# Patient Record
Sex: MALE | Employment: FULL TIME | URBAN - METROPOLITAN AREA
[De-identification: names, ages, dates, MRNs, and addresses within clinical notes are randomized per-mention and may not be internally consistent; named-entity substitution may affect disease eponyms.]

---

## 2023-03-02 LAB
CELLS COUNTED TOTAL (#) IN BODY FLUID: 100
CELLS COUNTED TOTAL (#) IN BODY FLUID: 100
CLARITY FLUID: NORMAL
CLARITY FLUID: NORMAL
COLOR OF BODY FLUID: NORMAL
COLOR OF BODY FLUID: NORMAL
ERYTHROCYTES (/UL) IN BODY FLUID: 1000 /UL
ERYTHROCYTES (/UL) IN BODY FLUID: 40 /UL
LEUKOCYTES (/UL) IN BODY FLUID: 2429 /UL
LEUKOCYTES (/UL) IN BODY FLUID: 2471 /UL
LYMPHOCYTES/100 LEUKOCYTES IN BODY FLUID BY MAN CT: 36 %
LYMPHOCYTES/100 LEUKOCYTES IN BODY FLUID BY MAN CT: 39 %
MONOCYTES+MACROPHAGES/100 WBC IN BODY FLUID BY MAN CT: 19 %
MONOCYTES+MACROPHAGES/100 WBC IN BODY FLUID BY MAN CT: 33 %
NEUTROPHILS/100 LEUKOCYTES IN BODY FLUID BY MANUAL COUNT: 31 %
NEUTROPHILS/100 LEUKOCYTES IN BODY FLUID BY MANUAL COUNT: 42 %

## 2023-03-03 LAB
CRYSTALS PATH REVIEW: NORMAL
CRYSTALS PATH REVIEW: NORMAL
JOINT FLUID CRYSTALS: NORMAL
JOINT FLUID CRYSTALS: NORMAL

## 2023-03-07 LAB
GRAM STAIN: NORMAL
GRAM STAIN: NORMAL
STERILE FLUID CULTURE/SMEAR: NORMAL
STERILE FLUID CULTURE/SMEAR: NORMAL

## 2023-03-21 LAB
ALANINE AMINOTRANSFERASE (SGPT) (U/L) IN SER/PLAS: 27 U/L (ref 10–52)
ALBUMIN (G/DL) IN SER/PLAS: 4.6 G/DL (ref 3.4–5)
ALKALINE PHOSPHATASE (U/L) IN SER/PLAS: 93 U/L (ref 33–120)
ANION GAP IN SER/PLAS: 16 MMOL/L (ref 10–20)
APPEARANCE, URINE: CLEAR
ASPARTATE AMINOTRANSFERASE (SGOT) (U/L) IN SER/PLAS: 27 U/L (ref 9–39)
BASOPHILS (10*3/UL) IN BLOOD BY AUTOMATED COUNT: 0.06 X10E9/L (ref 0–0.1)
BASOPHILS/100 LEUKOCYTES IN BLOOD BY AUTOMATED COUNT: 0.7 % (ref 0–2)
BILIRUBIN TOTAL (MG/DL) IN SER/PLAS: 0.4 MG/DL (ref 0–1.2)
BILIRUBIN, URINE: NEGATIVE
BLOOD, URINE: NEGATIVE
C REACTIVE PROTEIN (MG/L) IN SER/PLAS: 0.75 MG/DL
CALCIUM (MG/DL) IN SER/PLAS: 9.7 MG/DL (ref 8.6–10.3)
CARBON DIOXIDE, TOTAL (MMOL/L) IN SER/PLAS: 24 MMOL/L (ref 21–32)
CHLORIDE (MMOL/L) IN SER/PLAS: 101 MMOL/L (ref 98–107)
COLOR, URINE: YELLOW
COMPLEMENT C3 (MG/DL) IN SER/PLAS: 180 MG/DL (ref 87–200)
COMPLEMENT C4 (MG/DL) IN SER/PLAS: 58 MG/DL (ref 10–50)
CREATININE (MG/DL) IN SER/PLAS: 1.14 MG/DL (ref 0.5–1.3)
EOSINOPHILS (10*3/UL) IN BLOOD BY AUTOMATED COUNT: 0.13 X10E9/L (ref 0–0.7)
EOSINOPHILS/100 LEUKOCYTES IN BLOOD BY AUTOMATED COUNT: 1.6 % (ref 0–6)
ERYTHROCYTE DISTRIBUTION WIDTH (RATIO) BY AUTOMATED COUNT: 14.7 % (ref 11.5–14.5)
ERYTHROCYTE MEAN CORPUSCULAR HEMOGLOBIN CONCENTRATION (G/DL) BY AUTOMATED: 32.3 G/DL (ref 32–36)
ERYTHROCYTE MEAN CORPUSCULAR VOLUME (FL) BY AUTOMATED COUNT: 86 FL (ref 80–100)
ERYTHROCYTES (10*6/UL) IN BLOOD BY AUTOMATED COUNT: 5.06 X10E12/L (ref 4.5–5.9)
GFR MALE: 81 ML/MIN/1.73M2
GLUCOSE (MG/DL) IN SER/PLAS: 124 MG/DL (ref 74–99)
GLUCOSE, URINE: NEGATIVE MG/DL
HEMATOCRIT (%) IN BLOOD BY AUTOMATED COUNT: 43.6 % (ref 41–52)
HEMOGLOBIN (G/DL) IN BLOOD: 14.1 G/DL (ref 13.5–17.5)
HEPATITIS B VIRUS CORE AB (PRESENCE) IN SER/PLAS BY IMM: NONREACTIVE
HEPATITIS B VIRUS SURFACE AB (MIU/ML) IN SERUM: <3.1 MIU/ML
HEPATITIS B VIRUS SURFACE AG PRESENCE IN SERUM: NONREACTIVE
HEPATITIS C VIRUS AB PRESENCE IN SERUM: NONREACTIVE
IGG SUBCLASS 4 (MG/DL) IN SERUM: 12 MG/DL (ref 3–200)
IMMATURE GRANULOCYTES/100 LEUKOCYTES IN BLOOD BY AUTOMATED COUNT: 0.2 % (ref 0–0.9)
KETONES, URINE: NEGATIVE MG/DL
LEUKOCYTE ESTERASE, URINE: NEGATIVE
LEUKOCYTES (10*3/UL) IN BLOOD BY AUTOMATED COUNT: 8.3 X10E9/L (ref 4.4–11.3)
LYMPHOCYTES (10*3/UL) IN BLOOD BY AUTOMATED COUNT: 1.98 X10E9/L (ref 1.2–4.8)
LYMPHOCYTES/100 LEUKOCYTES IN BLOOD BY AUTOMATED COUNT: 23.8 % (ref 13–44)
MONOCYTES (10*3/UL) IN BLOOD BY AUTOMATED COUNT: 0.47 X10E9/L (ref 0.1–1)
MONOCYTES/100 LEUKOCYTES IN BLOOD BY AUTOMATED COUNT: 5.6 % (ref 2–10)
NEUTROPHILS (10*3/UL) IN BLOOD BY AUTOMATED COUNT: 5.66 X10E9/L (ref 1.2–7.7)
NEUTROPHILS/100 LEUKOCYTES IN BLOOD BY AUTOMATED COUNT: 68.1 % (ref 40–80)
NITRITE, URINE: NEGATIVE
PH, URINE: 5 (ref 5–8)
PLATELETS (10*3/UL) IN BLOOD AUTOMATED COUNT: 344 X10E9/L (ref 150–450)
POTASSIUM (MMOL/L) IN SER/PLAS: 3.8 MMOL/L (ref 3.5–5.3)
PROTEIN TOTAL: 7.8 G/DL (ref 6.4–8.2)
PROTEIN, URINE: NEGATIVE MG/DL
RHEUMATOID FACTOR (IU/ML) IN SERUM OR PLASMA: <10 IU/ML (ref 0–15)
SEDIMENTATION RATE, ERYTHROCYTE: 51 MM/H (ref 0–15)
SODIUM (MMOL/L) IN SER/PLAS: 137 MMOL/L (ref 136–145)
SPECIFIC GRAVITY, URINE: 1.03 (ref 1–1.03)
URATE (MG/DL) IN SER/PLAS: 7.6 MG/DL (ref 4–7.5)
UREA NITROGEN (MG/DL) IN SER/PLAS: 20 MG/DL (ref 6–23)
UROBILINOGEN, URINE: <2 MG/DL (ref 0–1.9)

## 2023-03-22 LAB
ANA PATTERN: ABNORMAL
ANA TITER: ABNORMAL
ANTI-CENTROMERE: <0.2 AI
ANTI-CHROMATIN: <0.2 AI
ANTI-DNA (DS): <1 IU/ML
ANTI-JO-1 IGG: <0.2 AI
ANTI-NUCLEAR ANTIBODY (ANA): POSITIVE
ANTI-RIBOSOMAL P: <0.2 AI
ANTI-RNP: <0.2 AI
ANTI-SCL-70: <0.2 AI
ANTI-SM/RNP: <0.2 AI
ANTI-SM: <0.2 AI
ANTI-SSA: <0.2 AI
ANTI-SSB: <0.2 AI

## 2023-03-23 LAB
HLAB27 TYPING: NEGATIVE
NIL(NEG) CONTROL SPOT COUNT: NORMAL
PANEL A SPOT COUNT: 1
PANEL B SPOT COUNT: 0
POS CONTROL SPOT COUNT: NORMAL
T-SPOT. TB INTERPRETATION: NEGATIVE

## 2023-03-24 LAB
ACHR BINDING AB, SERUM: 0 NMOL/L (ref 0–0.4)
CITRULLINE ANTIBODY, IGG: 2 UNITS (ref 0–19)
LYME ANTIBODIES SCREEN: 0.1 LIV (ref 0–1.2)

## 2023-05-16 LAB
ALANINE AMINOTRANSFERASE (SGPT) (U/L) IN SER/PLAS: 26 U/L (ref 10–52)
ALBUMIN (G/DL) IN SER/PLAS: 4.6 G/DL (ref 3.4–5)
ALKALINE PHOSPHATASE (U/L) IN SER/PLAS: 71 U/L (ref 33–120)
ANION GAP IN SER/PLAS: 10 MMOL/L (ref 10–20)
ASPARTATE AMINOTRANSFERASE (SGOT) (U/L) IN SER/PLAS: 28 U/L (ref 9–39)
BASOPHILS (10*3/UL) IN BLOOD BY AUTOMATED COUNT: 0.06 X10E9/L (ref 0–0.1)
BASOPHILS/100 LEUKOCYTES IN BLOOD BY AUTOMATED COUNT: 0.8 % (ref 0–2)
BILIRUBIN TOTAL (MG/DL) IN SER/PLAS: 0.3 MG/DL (ref 0–1.2)
C REACTIVE PROTEIN (MG/L) IN SER/PLAS: 0.64 MG/DL
CALCIUM (MG/DL) IN SER/PLAS: 10 MG/DL (ref 8.6–10.3)
CARBON DIOXIDE, TOTAL (MMOL/L) IN SER/PLAS: 27 MMOL/L (ref 21–32)
CHLORIDE (MMOL/L) IN SER/PLAS: 103 MMOL/L (ref 98–107)
CREATININE (MG/DL) IN SER/PLAS: 1.09 MG/DL (ref 0.5–1.3)
EOSINOPHILS (10*3/UL) IN BLOOD BY AUTOMATED COUNT: 0.13 X10E9/L (ref 0–0.7)
EOSINOPHILS/100 LEUKOCYTES IN BLOOD BY AUTOMATED COUNT: 1.7 % (ref 0–6)
ERYTHROCYTE DISTRIBUTION WIDTH (RATIO) BY AUTOMATED COUNT: 15.3 % (ref 11.5–14.5)
ERYTHROCYTE MEAN CORPUSCULAR HEMOGLOBIN CONCENTRATION (G/DL) BY AUTOMATED: 31.9 G/DL (ref 32–36)
ERYTHROCYTE MEAN CORPUSCULAR VOLUME (FL) BY AUTOMATED COUNT: 90 FL (ref 80–100)
ERYTHROCYTES (10*6/UL) IN BLOOD BY AUTOMATED COUNT: 4.64 X10E12/L (ref 4.5–5.9)
GFR MALE: 85 ML/MIN/1.73M2
GLUCOSE (MG/DL) IN SER/PLAS: 86 MG/DL (ref 74–99)
HEMATOCRIT (%) IN BLOOD BY AUTOMATED COUNT: 41.7 % (ref 41–52)
HEMOGLOBIN (G/DL) IN BLOOD: 13.3 G/DL (ref 13.5–17.5)
IMMATURE GRANULOCYTES/100 LEUKOCYTES IN BLOOD BY AUTOMATED COUNT: 0.3 % (ref 0–0.9)
LEUKOCYTES (10*3/UL) IN BLOOD BY AUTOMATED COUNT: 7.8 X10E9/L (ref 4.4–11.3)
LYMPHOCYTES (10*3/UL) IN BLOOD BY AUTOMATED COUNT: 2.07 X10E9/L (ref 1.2–4.8)
LYMPHOCYTES/100 LEUKOCYTES IN BLOOD BY AUTOMATED COUNT: 26.5 % (ref 13–44)
MONOCYTES (10*3/UL) IN BLOOD BY AUTOMATED COUNT: 0.7 X10E9/L (ref 0.1–1)
MONOCYTES/100 LEUKOCYTES IN BLOOD BY AUTOMATED COUNT: 9 % (ref 2–10)
NEUTROPHILS (10*3/UL) IN BLOOD BY AUTOMATED COUNT: 4.83 X10E9/L (ref 1.2–7.7)
NEUTROPHILS/100 LEUKOCYTES IN BLOOD BY AUTOMATED COUNT: 61.7 % (ref 40–80)
PLATELETS (10*3/UL) IN BLOOD AUTOMATED COUNT: 353 X10E9/L (ref 150–450)
POTASSIUM (MMOL/L) IN SER/PLAS: 4.1 MMOL/L (ref 3.5–5.3)
PROTEIN TOTAL: 7.8 G/DL (ref 6.4–8.2)
SEDIMENTATION RATE, ERYTHROCYTE: 20 MM/H (ref 0–15)
SODIUM (MMOL/L) IN SER/PLAS: 136 MMOL/L (ref 136–145)
UREA NITROGEN (MG/DL) IN SER/PLAS: 20 MG/DL (ref 6–23)

## 2023-07-18 LAB
ALANINE AMINOTRANSFERASE (SGPT) (U/L) IN SER/PLAS: 30 U/L (ref 10–52)
ALBUMIN (G/DL) IN SER/PLAS: 4.6 G/DL (ref 3.4–5)
ALKALINE PHOSPHATASE (U/L) IN SER/PLAS: 80 U/L (ref 33–120)
ANION GAP IN SER/PLAS: 17 MMOL/L (ref 10–20)
ASPARTATE AMINOTRANSFERASE (SGOT) (U/L) IN SER/PLAS: 34 U/L (ref 9–39)
BASOPHILS (10*3/UL) IN BLOOD BY AUTOMATED COUNT: 0.07 X10E9/L (ref 0–0.1)
BASOPHILS/100 LEUKOCYTES IN BLOOD BY AUTOMATED COUNT: 0.9 % (ref 0–2)
BILIRUBIN TOTAL (MG/DL) IN SER/PLAS: 0.4 MG/DL (ref 0–1.2)
C REACTIVE PROTEIN (MG/L) IN SER/PLAS: 0.79 MG/DL
CALCIUM (MG/DL) IN SER/PLAS: 9.8 MG/DL (ref 8.6–10.3)
CARBON DIOXIDE, TOTAL (MMOL/L) IN SER/PLAS: 25 MMOL/L (ref 21–32)
CHLORIDE (MMOL/L) IN SER/PLAS: 100 MMOL/L (ref 98–107)
CREATININE (MG/DL) IN SER/PLAS: 1.06 MG/DL (ref 0.5–1.3)
EOSINOPHILS (10*3/UL) IN BLOOD BY AUTOMATED COUNT: 0.13 X10E9/L (ref 0–0.7)
EOSINOPHILS/100 LEUKOCYTES IN BLOOD BY AUTOMATED COUNT: 1.6 % (ref 0–6)
ERYTHROCYTE DISTRIBUTION WIDTH (RATIO) BY AUTOMATED COUNT: 14.9 % (ref 11.5–14.5)
ERYTHROCYTE MEAN CORPUSCULAR HEMOGLOBIN CONCENTRATION (G/DL) BY AUTOMATED: 32.7 G/DL (ref 32–36)
ERYTHROCYTE MEAN CORPUSCULAR VOLUME (FL) BY AUTOMATED COUNT: 90 FL (ref 80–100)
ERYTHROCYTES (10*6/UL) IN BLOOD BY AUTOMATED COUNT: 4.74 X10E12/L (ref 4.5–5.9)
GFR MALE: 88 ML/MIN/1.73M2
GLUCOSE (MG/DL) IN SER/PLAS: 97 MG/DL (ref 74–99)
HEMATOCRIT (%) IN BLOOD BY AUTOMATED COUNT: 42.5 % (ref 41–52)
HEMOGLOBIN (G/DL) IN BLOOD: 13.9 G/DL (ref 13.5–17.5)
IMMATURE GRANULOCYTES/100 LEUKOCYTES IN BLOOD BY AUTOMATED COUNT: 0.2 % (ref 0–0.9)
LEUKOCYTES (10*3/UL) IN BLOOD BY AUTOMATED COUNT: 8.1 X10E9/L (ref 4.4–11.3)
LYMPHOCYTES (10*3/UL) IN BLOOD BY AUTOMATED COUNT: 1.73 X10E9/L (ref 1.2–4.8)
LYMPHOCYTES/100 LEUKOCYTES IN BLOOD BY AUTOMATED COUNT: 21.4 % (ref 13–44)
MONOCYTES (10*3/UL) IN BLOOD BY AUTOMATED COUNT: 0.71 X10E9/L (ref 0.1–1)
MONOCYTES/100 LEUKOCYTES IN BLOOD BY AUTOMATED COUNT: 8.8 % (ref 2–10)
NEUTROPHILS (10*3/UL) IN BLOOD BY AUTOMATED COUNT: 5.44 X10E9/L (ref 1.2–7.7)
NEUTROPHILS/100 LEUKOCYTES IN BLOOD BY AUTOMATED COUNT: 67.1 % (ref 40–80)
PLATELETS (10*3/UL) IN BLOOD AUTOMATED COUNT: 334 X10E9/L (ref 150–450)
POTASSIUM (MMOL/L) IN SER/PLAS: 4.5 MMOL/L (ref 3.5–5.3)
PROTEIN TOTAL: 7.9 G/DL (ref 6.4–8.2)
SEDIMENTATION RATE, ERYTHROCYTE: 27 MM/H (ref 0–15)
SODIUM (MMOL/L) IN SER/PLAS: 137 MMOL/L (ref 136–145)
UREA NITROGEN (MG/DL) IN SER/PLAS: 19 MG/DL (ref 6–23)

## 2023-09-05 ENCOUNTER — TELEPHONE (OUTPATIENT)
Dept: PRIMARY CARE | Facility: CLINIC | Age: 43
End: 2023-09-05
Payer: COMMERCIAL

## 2023-09-05 NOTE — TELEPHONE ENCOUNTER
Rx Refill Request Telephone Encounter    Name:  Laureano Dowell  :  [id number]  Medication Name:  hydrochlorothiazide  Specific Pharmacy location:  [facility]  Date of last appointment:  2022 (annual)  Date of next appointment:  N/A  Best number to reach patient:  [phone]

## 2023-09-07 NOTE — TELEPHONE ENCOUNTER
Laureano's wife called to ask about her prescription. Ashtyn informed them that he needs to schedule a visit in order to receive any refills. She said she will inform him of this so he can call back and schedule.

## 2023-09-18 PROBLEM — M62.89 PELVIC FLOOR DYSFUNCTION: Status: ACTIVE | Noted: 2023-09-18

## 2023-09-18 PROBLEM — F41.1 GENERALIZED ANXIETY DISORDER: Status: ACTIVE | Noted: 2023-09-18

## 2023-09-18 PROBLEM — R73.9 HYPERGLYCEMIA: Status: ACTIVE | Noted: 2023-09-18

## 2023-09-18 PROBLEM — L40.50 PSORIATIC ARTHROPATHY (MULTI): Status: ACTIVE | Noted: 2023-09-18

## 2023-09-18 PROBLEM — K21.9 ACID REFLUX: Status: ACTIVE | Noted: 2023-09-18

## 2023-09-18 PROBLEM — S86.819A STRAIN OF CALF MUSCLE: Status: ACTIVE | Noted: 2023-09-18

## 2023-09-18 PROBLEM — M17.11 ARTHRITIS OF KNEE, RIGHT: Status: ACTIVE | Noted: 2023-09-18

## 2023-09-18 PROBLEM — F41.9 ANXIETY: Status: ACTIVE | Noted: 2023-09-18

## 2023-09-18 PROBLEM — M25.461 EFFUSION OF RIGHT KNEE: Status: ACTIVE | Noted: 2023-09-18

## 2023-09-18 PROBLEM — I10 HTN (HYPERTENSION): Status: ACTIVE | Noted: 2023-09-18

## 2023-09-18 PROBLEM — E66.3 OVER WEIGHT: Status: ACTIVE | Noted: 2023-09-18

## 2023-09-18 PROBLEM — N34.2 URETHRITIS: Status: ACTIVE | Noted: 2023-09-18

## 2023-09-18 PROBLEM — L40.9 PSORIASIS: Status: ACTIVE | Noted: 2023-09-18

## 2023-09-18 PROBLEM — F41.0 PANIC ATTACKS: Status: ACTIVE | Noted: 2023-09-18

## 2023-09-18 PROBLEM — K76.0 FATTY LIVER: Status: ACTIVE | Noted: 2023-09-18

## 2023-09-18 PROBLEM — E78.5 HLD (HYPERLIPIDEMIA): Status: ACTIVE | Noted: 2023-09-18

## 2023-09-18 PROBLEM — S39.013A: Status: ACTIVE | Noted: 2023-09-18

## 2023-09-18 RX ORDER — LISINOPRIL 30 MG/1
1 TABLET ORAL DAILY
COMMUNITY
Start: 2021-08-23 | End: 2023-12-11 | Stop reason: SDUPTHER

## 2023-09-18 RX ORDER — PREDNISONE 20 MG/1
TABLET ORAL
COMMUNITY
End: 2024-01-11 | Stop reason: WASHOUT

## 2023-09-18 RX ORDER — NAPROXEN 500 MG/1
1 TABLET ORAL EVERY 12 HOURS
COMMUNITY
Start: 2021-07-20 | End: 2024-01-11 | Stop reason: WASHOUT

## 2023-09-18 RX ORDER — FOLIC ACID 1 MG/1
1 TABLET ORAL DAILY
COMMUNITY
End: 2023-10-16 | Stop reason: SDUPTHER

## 2023-09-18 RX ORDER — DOXYCYCLINE 100 MG/1
1 CAPSULE ORAL 2 TIMES DAILY
COMMUNITY
Start: 2022-12-29 | End: 2024-01-11 | Stop reason: WASHOUT

## 2023-09-18 RX ORDER — METHOTREXATE 2.5 MG/1
TABLET ORAL
COMMUNITY
End: 2023-10-18 | Stop reason: SDUPTHER

## 2023-09-18 RX ORDER — LOVASTATIN 40 MG/1
80 TABLET ORAL DAILY
COMMUNITY
End: 2023-12-11 | Stop reason: SDUPTHER

## 2023-09-18 RX ORDER — CYCLOBENZAPRINE HCL 5 MG
1 TABLET ORAL NIGHTLY PRN
COMMUNITY
Start: 2021-07-20 | End: 2024-01-11 | Stop reason: WASHOUT

## 2023-09-18 RX ORDER — OMEPRAZOLE 20 MG/1
TABLET, ORALLY DISINTEGRATING, DELAYED RELEASE ORAL
COMMUNITY

## 2023-09-18 RX ORDER — ALPRAZOLAM 0.5 MG/1
1 TABLET ORAL 2 TIMES DAILY
COMMUNITY
End: 2024-01-11 | Stop reason: WASHOUT

## 2023-09-18 RX ORDER — COLCHICINE 0.6 MG/1
TABLET ORAL
COMMUNITY
Start: 2023-01-30 | End: 2024-01-11 | Stop reason: WASHOUT

## 2023-10-05 DIAGNOSIS — E66.3 OVER WEIGHT: Primary | ICD-10-CM

## 2023-10-15 DIAGNOSIS — L40.50 PSORIATIC ARTHROPATHY (MULTI): Primary | ICD-10-CM

## 2023-10-16 ENCOUNTER — LAB (OUTPATIENT)
Dept: LAB | Facility: LAB | Age: 43
End: 2023-10-16
Payer: COMMERCIAL

## 2023-10-16 DIAGNOSIS — L40.50 PSORIATIC ARTHRITIS (MULTI): ICD-10-CM

## 2023-10-16 DIAGNOSIS — E66.3 OVER WEIGHT: ICD-10-CM

## 2023-10-16 DIAGNOSIS — Z79.899 HIGH RISK MEDICATION USE: ICD-10-CM

## 2023-10-16 LAB
CHOLEST SERPL-MCNC: 213 MG/DL (ref 0–199)
CHOLESTEROL/HDL RATIO: 3.4
EST. AVERAGE GLUCOSE BLD GHB EST-MCNC: 126 MG/DL
HBA1C MFR BLD: 6 %
HDLC SERPL-MCNC: 62.8 MG/DL
LDLC SERPL CALC-MCNC: 104 MG/DL
NON HDL CHOLESTEROL: 150 MG/DL (ref 0–149)
TRIGL SERPL-MCNC: 231 MG/DL (ref 0–149)
VLDL: 46 MG/DL (ref 0–40)

## 2023-10-16 PROCEDURE — 80053 COMPREHEN METABOLIC PANEL: CPT

## 2023-10-16 PROCEDURE — 80061 LIPID PANEL: CPT

## 2023-10-16 PROCEDURE — 83036 HEMOGLOBIN GLYCOSYLATED A1C: CPT

## 2023-10-16 PROCEDURE — 36415 COLL VENOUS BLD VENIPUNCTURE: CPT

## 2023-10-16 PROCEDURE — 86140 C-REACTIVE PROTEIN: CPT

## 2023-10-16 RX ORDER — FOLIC ACID 1 MG/1
1 TABLET ORAL DAILY
Qty: 30 TABLET | Refills: 2 | Status: SHIPPED | OUTPATIENT
Start: 2023-10-16 | End: 2023-10-18 | Stop reason: SDUPTHER

## 2023-10-16 NOTE — PROGRESS NOTES
Rheumatology Follow Up Outpatient  Note    Subjective   Laureano Dowell is a 43 y.o. male presenting today for No chief complaint on file..    History of Presenting Problem:   Rheum history:  Psoriatic arthritis: recurrent episodes of swelling in right knee (no associated hotness or redness) that resolves after 3-4 days with use of NSAIDs. S/p 2 arthrocentesis for drainage and last time 3/2023 he also had a bilateral knee cortisone injections. He is diagnosed with psoriasis on his knees for 15-20 years.  No eye inflammation, GI disease. No recent GI or  infections. No tick bites/Lyme. No enthesitis or dactylitis.  Arthrocentesis synovial fluid analysis 3/2023 and WBC 2741, N 31%, L 39%, negative cultures, no crystals.  He was started on MTX 3/2023.     Interval history:  He is on MTX  6 pills per week, he is tolerating well ***except for some fatigue for 24 hours post the dose. He denies any new joint swelling. His knee swelling never came back since he was started on MTX. No fever of cough and no recent infection.       Past Medical History:   Past Medical History:   Diagnosis Date    Lower abdominal pain, unspecified     Groin pain    Other conditions influencing health status 05/26/2020    History of cough    Personal history of other specified conditions 06/25/2020    History of dysuria    Viral infection, unspecified 05/26/2020    Headache due to viral infection       Allergies:   Allergies   Allergen Reactions    Erythromycin Unknown       Medications:   Current Outpatient Medications:     ALPRAZolam (Xanax) 0.5 mg tablet, Take 1 tablet (0.5 mg) by mouth 2 times a day., Disp: , Rfl:     colchicine, gout, 0.6 mg tablet, , Disp: , Rfl:     cyclobenzaprine (Flexeril) 5 mg tablet, Take 1 tablet (5 mg) by mouth as needed at bedtime., Disp: , Rfl:     doxycycline (Vibramycin) 100 mg capsule, Take 1 capsule (100 mg) by mouth 2 times a day., Disp: , Rfl:     folic acid (Folvite) 1 mg tablet, TAKE 1 TABLET BY MOUTH  "DAILY, Disp: 30 tablet, Rfl: 2    hydroCHLOROthiazide (Microzide) 12.5 mg capsule, Take 1 capsule (12.5 mg) by mouth once daily., Disp: , Rfl:     lisinopril 30 mg tablet, Take 1 tablet (30 mg) by mouth once daily., Disp: , Rfl:     lovastatin (Mevacor) 40 mg tablet, Take 2 tablets (80 mg) by mouth once daily., Disp: , Rfl:     methotrexate (Trexall) 2.5 mg tablet, TAKE 6 TABLETS BY MOUTH EVERY WEEK, Disp: , Rfl:     naproxen (Naprosyn) 500 mg tablet, Take 1 tablet (500 mg) by mouth every 12 hours., Disp: , Rfl:     omeprazole 20 mg tablet,disintegrat, delay rel, Omeprazole 20 MG Oral Tablet Delayed Release Disintegrating Refills: 0, Disp: , Rfl:     predniSONE (Deltasone) 20 mg tablet, , Disp: , Rfl:     Review of Systems:   Constitutional: Denies fever, chills   Eyes: Denies dry eyes, pain in the eyes   ENT: Denies dry mouth, loss of taste, sores in the mouth  Cardiovascular: Denies chest pain, palpitations   Respiratory: Denies shortness of breath   Gastrointestinal: Denies heartburn   Integumentary: Denies photosensitivity, rash or lesions, Raynaud's   Neurological: Denies any numbness or tingling    MSK: As per HPI.     All 10 review of systems have been reviewed and are negative for complaint except as noted in the HPI    Objective   Physical Examination:  There were no vitals filed for this visit.  Growth %ile SmartLinks can only be used for patients less than 20 years old.  Ht Readings from Last 1 Encounters:   07/18/23 1.778 m (5' 10\")     Wt Readings from Last 1 Encounters:   07/18/23 105 kg (231 lb)       General - NAD, sitting up in chair, well-groomed, pleasant, AAOx3  Head: Normocephalic, atraumatic  Eyes - PERRLA, EOMI. No conjunctiva injection.   Mouth/ENT - Moist oral and nasal mucosa. No facial rash.   Cardiovascular - Normal S1, S2. Regular rate and rhythm. No murmurs or rubs.  Lungs - Symmetric chest expansion. Clear to auscultation bilaterally.   Skin - No rashes or ulcers. Skin warm and dry. No " erythema on bilateral cheeks.  Extremities - No edema, cyanosis ,or clubbing  Neurological - Alert and oriented x 3,  grossly intact. No focal deficit.  Musculoskeletal -  Shoulders: Full ROM, without pain, no swelling, warmth or tenderness.  Elbows: Full ROM, without pain, no swelling, warmth or tenderness.  Wrists: Full ROM, without pain, no swelling, warmth or tenderness.  MCP: No swelling, warmth or tenderness. Metacarpal squeeze negative  PIP: No swelling, warmth or tenderness.  DIP: No swelling, warmth or tenderness.  Hands : 5/5.    Sacroiliac joints: No local tenderness. YOLI negative.   Hips: Full ROM.  No malalignment.  Knees:  Full ROM, without pain, no swelling, warmth or point tenderness.   Ankles: Full ROM, without pain, swelling, warmth or tenderness.  Toes:  Metatarsal squeeze negative  Cervical spine: No tenderness or limitation of movement  Lumbar spine: No tenderness or limitation of movement      Assessment/Plan   Laureano Dowell is a 43 y.o. male with PMHx of PMHx of HTN, HLP, anxiety, psoriasis, ?h/o urethritis, and right knee effusion who presenting today for follow up on PsA    #Psoriatic arthritis:  -Reviewed knee arthrocentesis synovial fluid analysis 3/2023 and WBC 2741, N 31%, L 39%, negative cultures, no crystals.  -Synovial fluid from 2/2023: WBC: 483 with 82% L and 18% mono, negative cultures, no crystals  -HLA B27 negative 3/2023  -Reviewed serologies and Xrays to rule out RA, SLE, sarcoidosis, Lyme and other etiologies of oligoarthritis and negative 3/2023.   -Reviewed SI Xrays 3/2023 to evaluate for axial disease and negative.   -Started MTX 3/2023 and he is on 15 mg weekly and tolerating well  -Reviewed labs from ***7/2023 and CBC/CMP ESR down to 27 and CRP normal    ##Medical management of high risk medication:  -Drug name: MTX  -Tolerating and no side effects  -Folic acid daily  -Labs reviewed as above  -Most recent TB/hep tests negative 3/2023  -Patient is advised to hold  therapy if they experience any signs of infection requiring antibiotic therapy, and to resume after full recovery and conclusion of antibiotic course. Patient is advised to contact provider in this case.  -Educated the patient about risks and benefits of such therapy, and that the benefits of this therapy outweigh the risks and thus we will continue.        The assessment and plan, risk and benefits were discussed with the patient. All of the patients questions were answered and patient agrees to the plan.      Altaf Arthur MD  Clinical   Department of Rheumatology   [facility]

## 2023-10-18 ENCOUNTER — APPOINTMENT (OUTPATIENT)
Dept: RHEUMATOLOGY | Facility: CLINIC | Age: 43
End: 2023-10-18
Payer: COMMERCIAL

## 2023-10-18 ENCOUNTER — TELEMEDICINE (OUTPATIENT)
Dept: RHEUMATOLOGY | Facility: CLINIC | Age: 43
End: 2023-10-18
Payer: COMMERCIAL

## 2023-10-18 DIAGNOSIS — L40.50 PSORIATIC ARTHROPATHY (MULTI): ICD-10-CM

## 2023-10-18 DIAGNOSIS — L40.50 PSORIATIC ARTHRITIS (MULTI): Primary | ICD-10-CM

## 2023-10-18 DIAGNOSIS — Z79.899 HIGH RISK MEDICATION USE: ICD-10-CM

## 2023-10-18 LAB
ALBUMIN SERPL BCP-MCNC: 4.7 G/DL (ref 3.4–5)
ALP SERPL-CCNC: 74 U/L (ref 33–120)
ALT SERPL W P-5'-P-CCNC: 32 U/L (ref 10–52)
ANION GAP SERPL CALC-SCNC: 23 MMOL/L (ref 10–20)
AST SERPL W P-5'-P-CCNC: 31 U/L (ref 9–39)
BILIRUB SERPL-MCNC: 0.4 MG/DL (ref 0–1.2)
BUN SERPL-MCNC: 17 MG/DL (ref 6–23)
CALCIUM SERPL-MCNC: 9.8 MG/DL (ref 8.6–10.3)
CHLORIDE SERPL-SCNC: 100 MMOL/L (ref 98–107)
CO2 SERPL-SCNC: 22 MMOL/L (ref 21–32)
CREAT SERPL-MCNC: 1.08 MG/DL (ref 0.5–1.3)
CRP SERPL-MCNC: 0.44 MG/DL
GFR SERPL CREATININE-BSD FRML MDRD: 86 ML/MIN/1.73M*2
GLUCOSE SERPL-MCNC: 72 MG/DL (ref 74–99)
POTASSIUM SERPL-SCNC: 4.8 MMOL/L (ref 3.5–5.3)
PROT SERPL-MCNC: 7.8 G/DL (ref 6.4–8.2)
SODIUM SERPL-SCNC: 140 MMOL/L (ref 136–145)

## 2023-10-18 PROCEDURE — 99215 OFFICE O/P EST HI 40 MIN: CPT | Performed by: STUDENT IN AN ORGANIZED HEALTH CARE EDUCATION/TRAINING PROGRAM

## 2023-10-18 RX ORDER — METHOTREXATE 2.5 MG/1
15 TABLET ORAL
Qty: 24 TABLET | Refills: 2 | Status: SHIPPED | OUTPATIENT
Start: 2023-10-18 | End: 2024-01-11 | Stop reason: SDUPTHER

## 2023-10-18 RX ORDER — FOLIC ACID 1 MG/1
1 TABLET ORAL DAILY
Qty: 30 TABLET | Refills: 2 | Status: SHIPPED | OUTPATIENT
Start: 2023-10-18 | End: 2024-01-11 | Stop reason: SDUPTHER

## 2023-10-18 NOTE — PROGRESS NOTES
Rheumatology Outpatient Virtual Follow up Note  Virtual or Telephone Consent    An interactive audio and video telecommunication system which permits real time communications between the patient (at the originating site) and provider (at the distant site) was utilized to provide this telehealth service.   Verbal consent was requested and obtained from Laureano Dowell on this date, 10/18/23 for a telehealth visit.        Subjective   Laureano Dowell is a 43 y.o. male presenting today for No chief complaint on file..    History of Presenting Problem:   Rheum history:  Psoriatic arthritis: recurrent episodes of swelling in right knee (no associated hotness or redness) that resolves after 3-4 days with use of NSAIDs. S/p 2 arthrocentesis for drainage and last time 3/2023 he also had a bilateral knee cortisone injections. He is diagnosed with psoriasis on his knees for 15-20 years.  No eye inflammation, GI disease. No recent GI or  infections. No tick bites/Lyme. No enthesitis or dactylitis.  Arthrocentesis synovial fluid analysis 3/2023 and WBC 2741, N 31%, L 39%, negative cultures, no crystals.  He was started on MTX 3/2023.     Interval history: : He stated MTX 3/2023 he is up to 6 pills per week, he is tolerating well and fatigue has been better.  He denies any new joint swelling. His knee swelling never came back since he was started on MTX. No fever of cough and no recent infection.         Past Medical History:   Past Medical History:   Diagnosis Date    Lower abdominal pain, unspecified     Groin pain    Other conditions influencing health status 05/26/2020    History of cough    Personal history of other specified conditions 06/25/2020    History of dysuria    Viral infection, unspecified 05/26/2020    Headache due to viral infection       Allergies:   Allergies   Allergen Reactions    Erythromycin Unknown       Medications:   Current Outpatient Medications:     ALPRAZolam (Xanax) 0.5 mg tablet, Take 1 tablet (0.5 mg)  "by mouth 2 times a day., Disp: , Rfl:     colchicine, gout, 0.6 mg tablet, , Disp: , Rfl:     cyclobenzaprine (Flexeril) 5 mg tablet, Take 1 tablet (5 mg) by mouth as needed at bedtime., Disp: , Rfl:     doxycycline (Vibramycin) 100 mg capsule, Take 1 capsule (100 mg) by mouth 2 times a day., Disp: , Rfl:     folic acid (Folvite) 1 mg tablet, TAKE 1 TABLET BY MOUTH DAILY, Disp: 30 tablet, Rfl: 2    hydroCHLOROthiazide (Microzide) 12.5 mg capsule, Take 1 capsule (12.5 mg) by mouth once daily., Disp: , Rfl:     lisinopril 30 mg tablet, Take 1 tablet (30 mg) by mouth once daily., Disp: , Rfl:     lovastatin (Mevacor) 40 mg tablet, Take 2 tablets (80 mg) by mouth once daily., Disp: , Rfl:     methotrexate (Trexall) 2.5 mg tablet, TAKE 6 TABLETS BY MOUTH EVERY WEEK, Disp: , Rfl:     naproxen (Naprosyn) 500 mg tablet, Take 1 tablet (500 mg) by mouth every 12 hours., Disp: , Rfl:     omeprazole 20 mg tablet,disintegrat, delay rel, Omeprazole 20 MG Oral Tablet Delayed Release Disintegrating Refills: 0, Disp: , Rfl:     predniSONE (Deltasone) 20 mg tablet, , Disp: , Rfl:     Review of Systems:   Constitutional: Denies fever, chills   Eyes: Denies dry eyes, pain in the eyes   ENT: Denies dry mouth, loss of taste, sores in the mouth  Cardiovascular: Denies chest pain, palpitations   Respiratory: Denies shortness of breath   Gastrointestinal: Denies heartburn   Integumentary: Denies photosensitivity, rash or lesions, Raynaud's   Neurological: Denies any numbness or tingling    MSK: As per HPI.     All 10 review of systems have been reviewed and are negative for complaint except as noted in the HPI    Objective   Physical Examination:  There were no vitals filed for this visit.  Growth %ile SmartLinks can only be used for patients less than 20 years old.  Ht Readings from Last 1 Encounters:   07/18/23 1.778 m (5' 10\")     Wt Readings from Last 1 Encounters:   07/18/23 105 kg (231 lb)       Exam limited, virtual " visit          Assessment/Plan   Laureano Dowell is a 43 y.o. male with PMHx of HTN, HLP, anxiety, psoriasis, ?h/o urethritis, and right knee effusion  who presenting today for follow up on PsA:      #Psoriatic arthritis:  -Reviewed knee arthrocentesis synovial fluid analysis 3/2023 and WBC 2741, N 31%, L 39%, negative cultures, no crystals.  -Synovial fluid from 2/2023: WBC: 483 with 82% L and 18% mono, negative cultures, no crystals  -HLA B27 negative 3/2023  -Reviewed serologies and Xrays to rule out RA, SLE, sarcoidosis, Lyme and other etiologies of oligoarthritis and negative 3/2023.   -Reviewed SI Xrays 3/2023 to evaluate for axial disease and negative.   -Started MTX 3/2023 and he is on 15 mg weekly and tolerating well  -Reviewed labs from 7/2023 and CBC/CMP normal ESR  27 and CRP normal    ##Medical management of high risk medication:  -Drug name: MTX  -Tolerating and no side effects  -Folic acid daily  -Labs reviewed as above  -Most recent TB/hep tests negative 3/2023  -Patient is advised to hold therapy if they experience any signs of infection requiring antibiotic therapy, and to resume after full recovery and conclusion of antibiotic course. Patient is advised to contact provider in this case.  -Educated the patient about risks and benefits of such therapy, and that the benefits of this therapy outweigh the risks and thus we will continue.      The assessment and plan, risk and benefits were discussed with the patient. All of the patients questions were answered and patient agrees to the plan.      Altaf Arthur MD  Clinical   Department of Rheumatology   [facility]

## 2023-12-11 ENCOUNTER — TELEPHONE (OUTPATIENT)
Dept: PRIMARY CARE | Facility: CLINIC | Age: 43
End: 2023-12-11
Payer: COMMERCIAL

## 2023-12-11 DIAGNOSIS — I10 PRIMARY HYPERTENSION: ICD-10-CM

## 2023-12-11 RX ORDER — LISINOPRIL 30 MG/1
30 TABLET ORAL DAILY
Qty: 90 TABLET | Refills: 0 | Status: SHIPPED | OUTPATIENT
Start: 2023-12-11 | End: 2024-01-17 | Stop reason: SDUPTHER

## 2023-12-11 RX ORDER — LOVASTATIN 40 MG/1
80 TABLET ORAL DAILY
Qty: 90 TABLET | Refills: 0 | Status: SHIPPED | OUTPATIENT
Start: 2023-12-11 | End: 2024-01-17 | Stop reason: SDUPTHER

## 2023-12-20 ENCOUNTER — APPOINTMENT (OUTPATIENT)
Dept: PRIMARY CARE | Facility: CLINIC | Age: 43
End: 2023-12-20
Payer: COMMERCIAL

## 2023-12-26 ENCOUNTER — LAB (OUTPATIENT)
Dept: LAB | Facility: LAB | Age: 43
End: 2023-12-26
Payer: COMMERCIAL

## 2023-12-26 DIAGNOSIS — Z79.899 HIGH RISK MEDICATION USE: ICD-10-CM

## 2023-12-26 DIAGNOSIS — L40.50 PSORIATIC ARTHRITIS (MULTI): ICD-10-CM

## 2023-12-26 LAB
BASOPHILS # BLD AUTO: 0.05 X10*3/UL (ref 0–0.1)
BASOPHILS NFR BLD AUTO: 0.6 %
EOSINOPHIL # BLD AUTO: 0.14 X10*3/UL (ref 0–0.7)
EOSINOPHIL NFR BLD AUTO: 1.8 %
ERYTHROCYTE [DISTWIDTH] IN BLOOD BY AUTOMATED COUNT: 15.3 % (ref 11.5–14.5)
ERYTHROCYTE [SEDIMENTATION RATE] IN BLOOD BY WESTERGREN METHOD: 8 MM/H (ref 0–15)
HCT VFR BLD AUTO: 45.6 % (ref 41–52)
HGB BLD-MCNC: 14.2 G/DL (ref 13.5–17.5)
IMM GRANULOCYTES # BLD AUTO: 0.02 X10*3/UL (ref 0–0.7)
IMM GRANULOCYTES NFR BLD AUTO: 0.3 % (ref 0–0.9)
LYMPHOCYTES # BLD AUTO: 1.99 X10*3/UL (ref 1.2–4.8)
LYMPHOCYTES NFR BLD AUTO: 25.4 %
MCH RBC QN AUTO: 28.9 PG (ref 26–34)
MCHC RBC AUTO-ENTMCNC: 31.1 G/DL (ref 32–36)
MCV RBC AUTO: 93 FL (ref 80–100)
MONOCYTES # BLD AUTO: 0.72 X10*3/UL (ref 0.1–1)
MONOCYTES NFR BLD AUTO: 9.2 %
NEUTROPHILS # BLD AUTO: 4.93 X10*3/UL (ref 1.2–7.7)
NEUTROPHILS NFR BLD AUTO: 62.7 %
NRBC BLD-RTO: 0 /100 WBCS (ref 0–0)
PLATELET # BLD AUTO: 368 X10*3/UL (ref 150–450)
RBC # BLD AUTO: 4.91 X10*6/UL (ref 4.5–5.9)
WBC # BLD AUTO: 7.9 X10*3/UL (ref 4.4–11.3)

## 2023-12-26 PROCEDURE — 85652 RBC SED RATE AUTOMATED: CPT

## 2023-12-26 PROCEDURE — 36415 COLL VENOUS BLD VENIPUNCTURE: CPT

## 2023-12-26 PROCEDURE — 85025 COMPLETE CBC W/AUTO DIFF WBC: CPT

## 2024-01-09 NOTE — PROGRESS NOTES
Rheumatology Outpatient Virtual Follow up Note  Virtual or Telephone Consent    An interactive audio and video telecommunication system which permits real time communications between the patient (at the originating site) and provider (at the distant site) was utilized to provide this telehealth service.   Verbal consent was requested and obtained from Laureano Dowell on this date, 01/11/24 for a telehealth visit.        Subjective   Laureano Dowell is a 43 y.o. male presenting today for Joint Pain.    History of Presenting Problem:   Rheum history:  Psoriatic arthritis: recurrent episodes of swelling in right knee (no associated hotness or redness) that resolves after 3-4 days with use of NSAIDs. S/p 2 arthrocentesis for drainage and last time 3/2023 he also had a bilateral knee cortisone injections. He is diagnosed with psoriasis on his knees for 15-20 years.  No eye inflammation, GI disease. No recent GI or  infections. No tick bites/Lyme. No enthesitis or dactylitis.  Arthrocentesis synovial fluid analysis 3/2023 and WBC 2741, N 31%, L 39%, negative cultures, no crystals.  He was started on MTX 3/2023.     Interval history: : He stated MTX 3/2023 he is on 6 pills per week now, he is tolerating well and fatigue has been better.  He denies any new joint swelling. His knee swelling never came back since he was started on MTX. No fever of cough and no recent infection.         Past Medical History:   Past Medical History:   Diagnosis Date    Lower abdominal pain, unspecified     Groin pain    Other conditions influencing health status 05/26/2020    History of cough    Personal history of other specified conditions 06/25/2020    History of dysuria    Viral infection, unspecified 05/26/2020    Headache due to viral infection       Allergies:   Allergies   Allergen Reactions    Erythromycin Unknown       Medications:   Current Outpatient Medications:     ALPRAZolam (Xanax) 0.5 mg tablet, Take 1 tablet (0.5 mg) by mouth 2  times a day., Disp: , Rfl:     colchicine, gout, 0.6 mg tablet, , Disp: , Rfl:     cyclobenzaprine (Flexeril) 5 mg tablet, Take 1 tablet (5 mg) by mouth as needed at bedtime., Disp: , Rfl:     doxycycline (Vibramycin) 100 mg capsule, Take 1 capsule (100 mg) by mouth 2 times a day., Disp: , Rfl:     folic acid (Folvite) 1 mg tablet, Take 1 tablet (1 mg) by mouth once daily., Disp: 30 tablet, Rfl: 2    hydroCHLOROthiazide (Microzide) 12.5 mg capsule, Take 1 capsule (12.5 mg) by mouth once daily., Disp: , Rfl:     lisinopril 30 mg tablet, Take 1 tablet (30 mg) by mouth once daily., Disp: 90 tablet, Rfl: 0    lovastatin (Mevacor) 40 mg tablet, Take 2 tablets (80 mg) by mouth once daily., Disp: 90 tablet, Rfl: 0    methotrexate (Trexall) 2.5 mg tablet, Take 6 tablets (15 mg total) by mouth 1 (one) time per week.  Follow directions carefully, and ask to explain any part you do not understand. Take exactly as directed., Disp: 24 tablet, Rfl: 2    naproxen (Naprosyn) 500 mg tablet, Take 1 tablet (500 mg) by mouth every 12 hours., Disp: , Rfl:     omeprazole 20 mg tablet,disintegrat, delay rel, Omeprazole 20 MG Oral Tablet Delayed Release Disintegrating Refills: 0, Disp: , Rfl:     predniSONE (Deltasone) 20 mg tablet, , Disp: , Rfl:     Review of Systems:   Constitutional: Denies fever, chills   Eyes: Denies dry eyes, pain in the eyes   ENT: Denies dry mouth, loss of taste, sores in the mouth  Cardiovascular: Denies chest pain, palpitations   Respiratory: Denies shortness of breath   Gastrointestinal: Denies heartburn   Integumentary: Denies photosensitivity, rash or lesions, Raynaud's   Neurological: Denies any numbness or tingling    MSK: As per HPI.     All 10 review of systems have been reviewed and are negative for complaint except as noted in the HPI    Objective   Physical Examination:  There were no vitals filed for this visit.  Growth %[employer] can only be used for patients less than 20 years old.  Ht Readings  "from Last 1 Encounters:   07/18/23 1.778 m (5' 10\")     Wt Readings from Last 1 Encounters:   07/18/23 105 kg (231 lb)       Exam limited, virtual visit          Assessment/Plan   Laureano Dowell is a 43 y.o. male with PMHx of HTN, HLP, anxiety, psoriasis, ?h/o urethritis, and right knee effusion  who presenting today for follow up on PsA:      #Psoriatic arthritis:  -Reviewed knee arthrocentesis synovial fluid analysis 3/2023 and WBC 2741, N 31%, L 39%, negative cultures, no crystals.  -Synovial fluid from 2/2023: WBC: 483 with 82% L and 18% mono, negative cultures, no crystals  -HLA B27 negative 3/2023  -Reviewed serologies and Xrays to rule out RA, SLE, sarcoidosis, Lyme and other etiologies of oligoarthritis and negative 3/2023.   -Reviewed SI Xrays 3/2023 to evaluate for axial disease and negative.   -Started MTX 3/2023 and he is on 15 mg weekly and tolerating well  -Reviewed labs from 12/2023 and CBC/CMP normal ESR  and CRP normal    ##Medical management of high risk medication:  -Drug name: MTX  -Tolerating and no side effects  -Folic acid daily  -Labs reviewed as above  -Most recent TB/hep tests negative 3/2023  -Patient is advised to hold therapy if they experience any signs of infection requiring antibiotic therapy, and to resume after full recovery and conclusion of antibiotic course. Patient is advised to contact provider in this case.  -Educated the patient about risks and benefits of such therapy, and that the benefits of this therapy outweigh the risks and thus we will continue.      The assessment and plan, risk and benefits were discussed with the patient. All of the patients questions were answered and patient agrees to the plan.      Altaf Arthur MD  Clinical   Department of Rheumatology   [facility]      "

## 2024-01-11 ENCOUNTER — TELEMEDICINE (OUTPATIENT)
Dept: RHEUMATOLOGY | Facility: CLINIC | Age: 44
End: 2024-01-11
Payer: COMMERCIAL

## 2024-01-11 DIAGNOSIS — Z79.899 HIGH RISK MEDICATION USE: ICD-10-CM

## 2024-01-11 DIAGNOSIS — L40.50 PSORIATIC ARTHROPATHY (MULTI): ICD-10-CM

## 2024-01-11 DIAGNOSIS — L40.50 PSORIATIC ARTHRITIS (MULTI): Primary | ICD-10-CM

## 2024-01-11 PROCEDURE — 99215 OFFICE O/P EST HI 40 MIN: CPT | Performed by: STUDENT IN AN ORGANIZED HEALTH CARE EDUCATION/TRAINING PROGRAM

## 2024-01-11 RX ORDER — FOLIC ACID 1 MG/1
1 TABLET ORAL DAILY
Qty: 30 TABLET | Refills: 2 | Status: SHIPPED | OUTPATIENT
Start: 2024-01-11 | End: 2024-03-21 | Stop reason: SDUPTHER

## 2024-01-11 RX ORDER — METHOTREXATE 2.5 MG/1
15 TABLET ORAL
Qty: 25 TABLET | Refills: 2 | Status: SHIPPED | OUTPATIENT
Start: 2024-01-11 | End: 2024-03-21 | Stop reason: SDUPTHER

## 2024-01-17 ENCOUNTER — OFFICE VISIT (OUTPATIENT)
Dept: PRIMARY CARE | Facility: CLINIC | Age: 44
End: 2024-01-17
Payer: COMMERCIAL

## 2024-01-17 VITALS
HEART RATE: 98 BPM | WEIGHT: 227.3 LBS | OXYGEN SATURATION: 98 % | BODY MASS INDEX: 32.54 KG/M2 | SYSTOLIC BLOOD PRESSURE: 161 MMHG | DIASTOLIC BLOOD PRESSURE: 90 MMHG | TEMPERATURE: 98.2 F | HEIGHT: 70 IN | RESPIRATION RATE: 18 BRPM

## 2024-01-17 DIAGNOSIS — E78.49 OTHER HYPERLIPIDEMIA: ICD-10-CM

## 2024-01-17 DIAGNOSIS — I10 PRIMARY HYPERTENSION: ICD-10-CM

## 2024-01-17 DIAGNOSIS — Z12.11 SCREENING FOR COLON CANCER: ICD-10-CM

## 2024-01-17 DIAGNOSIS — K21.9 GASTROESOPHAGEAL REFLUX DISEASE WITHOUT ESOPHAGITIS: ICD-10-CM

## 2024-01-17 DIAGNOSIS — Z00.00 ROUTINE GENERAL MEDICAL EXAMINATION AT A HEALTH CARE FACILITY: Primary | ICD-10-CM

## 2024-01-17 DIAGNOSIS — L40.9 PSORIASIS: ICD-10-CM

## 2024-01-17 DIAGNOSIS — R73.03 PREDIABETES: ICD-10-CM

## 2024-01-17 PROBLEM — M62.89 PELVIC FLOOR DYSFUNCTION: Status: RESOLVED | Noted: 2023-09-18 | Resolved: 2024-01-17

## 2024-01-17 PROBLEM — S39.013A: Status: RESOLVED | Noted: 2023-09-18 | Resolved: 2024-01-17

## 2024-01-17 PROBLEM — N34.2 URETHRITIS: Status: RESOLVED | Noted: 2023-09-18 | Resolved: 2024-01-17

## 2024-01-17 PROBLEM — F41.1 GENERALIZED ANXIETY DISORDER: Status: RESOLVED | Noted: 2023-09-18 | Resolved: 2024-01-17

## 2024-01-17 PROBLEM — F41.9 ANXIETY: Status: RESOLVED | Noted: 2023-09-18 | Resolved: 2024-01-17

## 2024-01-17 PROBLEM — F41.0 PANIC ATTACKS: Status: RESOLVED | Noted: 2023-09-18 | Resolved: 2024-01-17

## 2024-01-17 PROBLEM — S86.819A STRAIN OF CALF MUSCLE: Status: RESOLVED | Noted: 2023-09-18 | Resolved: 2024-01-17

## 2024-01-17 PROCEDURE — 3077F SYST BP >= 140 MM HG: CPT | Performed by: NURSE PRACTITIONER

## 2024-01-17 PROCEDURE — 99396 PREV VISIT EST AGE 40-64: CPT | Performed by: NURSE PRACTITIONER

## 2024-01-17 PROCEDURE — 3080F DIAST BP >= 90 MM HG: CPT | Performed by: NURSE PRACTITIONER

## 2024-01-17 RX ORDER — OMEGA-3-ACID ETHYL ESTERS 1 G/1
1 CAPSULE, LIQUID FILLED ORAL 2 TIMES DAILY
Qty: 180 CAPSULE | Refills: 0 | Status: SHIPPED | OUTPATIENT
Start: 2024-01-17 | End: 2024-01-19 | Stop reason: ALTCHOICE

## 2024-01-17 RX ORDER — LISINOPRIL 30 MG/1
30 TABLET ORAL DAILY
Qty: 90 TABLET | Refills: 1 | Status: SHIPPED | OUTPATIENT
Start: 2024-01-17 | End: 2024-03-12

## 2024-01-17 RX ORDER — LOVASTATIN 40 MG/1
80 TABLET ORAL DAILY
Qty: 90 TABLET | Refills: 1 | Status: SHIPPED | OUTPATIENT
Start: 2024-01-17 | End: 2024-04-16

## 2024-01-17 ASSESSMENT — ENCOUNTER SYMPTOMS
GASTROINTESTINAL NEGATIVE: 1
HYPERACTIVE: 0
NEUROLOGICAL NEGATIVE: 1
CONFUSION: 0
PSYCHIATRIC NEGATIVE: 1
CONSTITUTIONAL NEGATIVE: 1
ALLERGIC/IMMUNOLOGIC NEGATIVE: 1
CARDIOVASCULAR NEGATIVE: 1
EYES NEGATIVE: 1
NERVOUS/ANXIOUS: 0
DECREASED CONCENTRATION: 0
AGITATION: 0
ARTHRALGIAS: 1
JOINT SWELLING: 0
BACK PAIN: 0
ENDOCRINE NEGATIVE: 1
SLEEP DISTURBANCE: 0
COLOR CHANGE: 0
HEMATOLOGIC/LYMPHATIC NEGATIVE: 1
WOUND: 0
RESPIRATORY NEGATIVE: 1
MYALGIAS: 0
NECK PAIN: 0
HALLUCINATIONS: 0
NECK STIFFNESS: 0
DYSPHORIC MOOD: 0

## 2024-01-17 ASSESSMENT — ANXIETY QUESTIONNAIRES
1. FEELING NERVOUS, ANXIOUS, OR ON EDGE: NOT AT ALL
GAD7 TOTAL SCORE: 0
4. TROUBLE RELAXING: NOT AT ALL
6. BECOMING EASILY ANNOYED OR IRRITABLE: NOT AT ALL
5. BEING SO RESTLESS THAT IT IS HARD TO SIT STILL: NOT AT ALL
2. NOT BEING ABLE TO STOP OR CONTROL WORRYING: NOT AT ALL
3. WORRYING TOO MUCH ABOUT DIFFERENT THINGS: NOT AT ALL
7. FEELING AFRAID AS IF SOMETHING AWFUL MIGHT HAPPEN: NOT AT ALL
IF YOU CHECKED OFF ANY PROBLEMS ON THIS QUESTIONNAIRE, HOW DIFFICULT HAVE THESE PROBLEMS MADE IT FOR YOU TO DO YOUR WORK, TAKE CARE OF THINGS AT HOME, OR GET ALONG WITH OTHER PEOPLE: NOT DIFFICULT AT ALL

## 2024-01-17 ASSESSMENT — PATIENT HEALTH QUESTIONNAIRE - PHQ9
1. LITTLE INTEREST OR PLEASURE IN DOING THINGS: NOT AT ALL
SUM OF ALL RESPONSES TO PHQ9 QUESTIONS 1 AND 2: 0
2. FEELING DOWN, DEPRESSED OR HOPELESS: NOT AT ALL

## 2024-01-17 ASSESSMENT — PAIN SCALES - GENERAL: PAINLEVEL: 0-NO PAIN

## 2024-01-17 NOTE — ASSESSMENT & PLAN NOTE
Has psoriatic arthritis in bilateral knees- had them drained by rheumatology. Methotrexate once a week and keeps symptoms controlled. Feet hurt in the morning too.    [Age Appropriate] : age appropriate developmental milestones not met [FreeTextEntry3] : have special accommodations in school for testing [FreeTextEntry5] : no longer needs ST

## 2024-01-17 NOTE — ASSESSMENT & PLAN NOTE
Lovastatin, Triglycerides of primary concern- start Lovaza 1 gm twice a day with meals- and recheck cholesterol levels in April.   Also discussed 1.7 g powder nightly to twice a day to try to block and reduce cholesterol absorption.

## 2024-01-17 NOTE — ASSESSMENT & PLAN NOTE
Lisinopril 30 mg usually in the 130s at home. Patient to check more frequently and do 3 day ambulatory blood pressure readings and report back to me.   If blood pressures are persistently elevated we will add in hydrochlorothiazide at 25 mg and monitor response- has been on this in the past but ran out of medications.

## 2024-01-17 NOTE — PROGRESS NOTES
"Subjective   Patient ID: Laureano Dowell is a 43 y.o. male who presents for No chief complaint on file..    Patient has psoriatic arthritis controlled on methotrexate. Goes to rheumatology every three months. He sees Altaf Arthur for rheumatology. Has some nail changes and mild skin changes- red plaque.   Has not had an eye exam this year-recommend him seeing Dr. Obrien in [place] lives in [place].   He goes to the dentist regularly.   He reports his diet is not the most healthy.   He has a gym at his place of work and periodically lifts weights, but admits he could do more.           Review of Systems   Constitutional: Negative.    HENT: Negative.     Eyes: Negative.    Respiratory: Negative.     Cardiovascular: Negative.    Gastrointestinal: Negative.    Endocrine: Negative.    Genitourinary: Negative.    Musculoskeletal:  Positive for arthralgias. Negative for back pain, gait problem, joint swelling, myalgias, neck pain and neck stiffness.   Skin:  Positive for rash (red plaques on hands). Negative for color change, pallor and wound.   Allergic/Immunologic: Negative.    Neurological: Negative.    Hematological: Negative.    Psychiatric/Behavioral: Negative.  Negative for agitation, behavioral problems, confusion, decreased concentration, dysphoric mood, hallucinations, self-injury, sleep disturbance and suicidal ideas. The patient is not nervous/anxious and is not hyperactive.        Objective   /90   Pulse 98   Temp 36.8 °C (98.2 °F) (Temporal)   Resp 18   Ht 1.79 m (5' 10.47\")   Wt 103 kg (227 lb 4.8 oz)   SpO2 98%   BMI 32.18 kg/m²     Physical Exam  Vitals and nursing note reviewed.   Constitutional:       Appearance: Normal appearance.   HENT:      Head: Normocephalic and atraumatic.      Comments: Mallampati class III  Neck 18.5     Nose: Nose normal.      Mouth/Throat:      Mouth: Mucous membranes are moist.   Eyes:      Pupils: Pupils are equal, round, and reactive to light.   Cardiovascular: "      Rate and Rhythm: Normal rate and regular rhythm.      Pulses: Normal pulses.      Heart sounds: Normal heart sounds.   Pulmonary:      Effort: Pulmonary effort is normal.      Breath sounds: Normal breath sounds.   Abdominal:      Palpations: Abdomen is soft.   Musculoskeletal:         General: Normal range of motion.      Cervical back: Normal range of motion.   Skin:     Capillary Refill: Capillary refill takes 2 to 3 seconds.   Neurological:      General: No focal deficit present.      Mental Status: He is alert and oriented to person, place, and time.      Cranial Nerves: Cranial nerves 2-12 are intact.      Sensory: Sensation is intact.      Motor: Motor function is intact.      Deep Tendon Reflexes: Reflexes are normal and symmetric.         Assessment/Plan   Problem List Items Addressed This Visit             ICD-10-CM    Acid reflux K21.9     Continued use of prilosec          HLD (hyperlipidemia) E78.5     Lovastatin, Triglycerides of primary concern- start Lovaza 1 gm twice a day with meals- and recheck cholesterol levels in April.   Also discussed 1.7 g powder nightly to twice a day to try to block and reduce cholesterol absorption.          Relevant Medications    omega-3 acid ethyl esters (Lovaza) 1 gram capsule    Other Relevant Orders    Lipid panel    HTN (hypertension) I10     Lisinopril 30 mg usually in the 130s at home. Patient to check more frequently and do 3 day ambulatory blood pressure readings and report back to me.   If blood pressures are persistently elevated we will add in hydrochlorothiazide at 25 mg and monitor response- has been on this in the past but ran out of medications.          Relevant Medications    lisinopril 30 mg tablet    lovastatin (Mevacor) 40 mg tablet    Psoriasis L40.9     Has psoriatic arthritis in bilateral knees- had them drained by rheumatology. Methotrexate once a week and keeps symptoms controlled. Feet hurt in the morning too.           Other Visit  Diagnoses         Codes    Routine general medical examination at a health care facility    -  Primary Z00.00    Prediabetes     R73.03    Relevant Orders    Hemoglobin A1C    Screening for colon cancer     Z12.11    Relevant Orders    Cologuard® colon cancer screening

## 2024-01-17 NOTE — PATIENT INSTRUCTIONS
2 cycles of pressure in the morning 2 in the evening for 3 days and send to me.   Start Lovaza, start metamucil following package instructions for cholesterol reduction.   Ask rheumatology about timing with methotrexate to get your Tdap Updated and Pneumonia vaccine.     Diet alone has the potential to decrease LDL by 30-40 mg/dL! Please follow a diet rich in fruits, vegetables, and lean meats and sparse in saturated fats and red meats     CDC recommends adults need 150 minutes of moderate-intensity physical activity and 2 days of muscle strengthening activity, according to the current Physical Activity Guidelines for Americans (CDC Website). Aerobic exercise for 40 minutes 3-4 times a week proves most beneficial.     We now have 4 ASCVD risk categories per the 2018 update. These are according to the patient’s 10-year ASCVD risk:     Low: < 5%  Borderline: 5- <7.5%  Intermediate: 7.5- <20%  High: > 20%  Your goals are going to depend on the ASCVD risk category of your patient in primary prevention. For those in the “intermediate” category, an LDL reduction of 30-49% is appropriate. In those at high risk (meaning you have had an event), however, require a reduction of LDL by > 50%. Anyone who does not meet these goals can be considered for step up in therapy.  The earliest you should recheck a lipid panel after starting a statin is at 4-12 weeks (1-3 months). Afterwards, you can check anywhere from 3 to 13 months.

## 2024-01-19 DIAGNOSIS — E78.1 HYPERTRIGLYCERIDEMIA: Primary | ICD-10-CM

## 2024-02-15 ENCOUNTER — OFFICE VISIT (OUTPATIENT)
Dept: ORTHOPEDIC SURGERY | Facility: CLINIC | Age: 44
End: 2024-02-15
Payer: COMMERCIAL

## 2024-02-15 ENCOUNTER — APPOINTMENT (OUTPATIENT)
Dept: ORTHOPEDIC SURGERY | Facility: CLINIC | Age: 44
End: 2024-02-15
Payer: COMMERCIAL

## 2024-02-15 DIAGNOSIS — S86.111A STRAIN OF RIGHT GASTROCNEMIUS MUSCLE, INITIAL ENCOUNTER: Primary | ICD-10-CM

## 2024-02-15 DIAGNOSIS — M79.661 RIGHT CALF PAIN: ICD-10-CM

## 2024-02-15 PROCEDURE — 76942 ECHO GUIDE FOR BIOPSY: CPT | Performed by: STUDENT IN AN ORGANIZED HEALTH CARE EDUCATION/TRAINING PROGRAM

## 2024-02-15 PROCEDURE — 99213 OFFICE O/P EST LOW 20 MIN: CPT | Performed by: FAMILY MEDICINE

## 2024-02-15 PROCEDURE — 20552 NJX 1/MLT TRIGGER POINT 1/2: CPT | Performed by: STUDENT IN AN ORGANIZED HEALTH CARE EDUCATION/TRAINING PROGRAM

## 2024-02-15 RX ADMIN — TRIAMCINOLONE ACETONIDE 20 MG: 40 INJECTION, SUSPENSION INTRA-ARTICULAR; INTRAMUSCULAR at 10:07

## 2024-02-15 RX ADMIN — LIDOCAINE HYDROCHLORIDE 1 ML: 10 INJECTION INFILTRATION; PERINEURAL at 10:07

## 2024-02-15 ASSESSMENT — PAIN - FUNCTIONAL ASSESSMENT: PAIN_FUNCTIONAL_ASSESSMENT: 0-10

## 2024-02-15 ASSESSMENT — PAIN SCALES - GENERAL: PAINLEVEL_OUTOF10: 7

## 2024-02-15 NOTE — PROGRESS NOTES
FUV R calf pain (CSI 9/8/22)    Today's Date: 2/15/2024       Subjective:     HPI: Laureano Dowell is a 43 y.o. who is previously known to me presents today for recurrence of right medial calf pain.  He was previously seen for the same complaint on 9/8/2022, and received trigger point injections into the mid medial soleus.  States that this injection gave him greater than 1 year of relief of recurrence of symptoms around 1 month ago, though symptoms are more proximal this time.  He is requesting repeat injection for long-term relief.  Denies specific injury or trauma to the leg.  He has been following with a rheumatologist for his psoriatic arthritis and has seen improvement in his joint pains.     Review of Systems: See pertinent ROS in HPI above     Patient's Allergies, Current Medications, Past Medical History, Past Surgical History, Family History, Social History reviewed.     Objective:  There is no height or weight on file to calculate BMI.    Physical Examination:     Musculoskeletal: TTP of the proximal medial gastrocnemius on the right medial calf, no erythema, ecchymosis, edema, laceration or deformity. Patient with 5/5 strength in bilateral LE. Pain with strength testing on the right with plantar flexion and inversion. Patient with normal sensation. Negative Wei.       Patient ID: Laureano Dowell is a 43 y.o. male.    L Inj/Asp on 2/15/2024 10:07 AM  Indications: pain  Details: 25 G needle, ultrasound-guided  Medications: 20 mg triamcinolone acetonide 40 mg/mL; 1 mL lidocaine 10 mg/mL (1 %)    Ultrasound-guided trigger point injection to the right musculocutaneous junction of the lateral medial gastrocnemius. Risks, benefits, and alternatives were explained to the patient and verbal and written consent was obtained. The patient was placed in a prone position and the linear ultrasound transducer was placed over the right medial gastroc. The area of pathology was identified within the musculotendinous junction  of the medial gastroc the area of injection was cleaned with Betadine prep. ethyl chloride spray was used to numb the skin. Next 25-gauge 2 inch needle was used to inject 20mg of Kenalog and 1 cc of 1% lidocaine into the area of pathology under ultrasound. The fluid flowed freely without obstruction and the entire contents of the syringe was injected. The needle was then removed, the areas cleaned with an alcohol prep, and a sterile Band-Aid was placed. The patient tolerated the procedure well and there were no complications. Ultrasound images were obtained throughout the procedure and stored for review at a later time if needed.           Assessment and Plan:     1. Right calf pain  Point of Care Ultrasound        Right medial gastroc strain status post ultrasound-guided trigger point injection. We are able to see an area of hypoechogenicity on ultrasound and this area was injected with a small amount of lidocaine and steroid. Hopefully this will be both diagnostic and therapeutic. He was educated on signs and symptoms of local reaction and infection. He should call the office if he experiences any of those. He should take it easy on the calf the next 24 to 48 hours, rest, ice, and anti-inflammatories. After that, he can return to his normal activity. If he does not get any improvement, we will pursue MRI of the right leg. He will follow-up with me as needed. All of his questions were answered and he agrees with the treatment plan.     **This note was dictated using Dragon speech recognition software and was not corrected for spelling or grammatical errors**

## 2024-02-19 RX ORDER — LIDOCAINE HYDROCHLORIDE 10 MG/ML
1 INJECTION INFILTRATION; PERINEURAL
Status: COMPLETED | OUTPATIENT
Start: 2024-02-15 | End: 2024-02-15

## 2024-02-19 RX ORDER — TRIAMCINOLONE ACETONIDE 40 MG/ML
20 INJECTION, SUSPENSION INTRA-ARTICULAR; INTRAMUSCULAR
Status: COMPLETED | OUTPATIENT
Start: 2024-02-15 | End: 2024-02-15

## 2024-03-12 DIAGNOSIS — I10 PRIMARY HYPERTENSION: ICD-10-CM

## 2024-03-12 RX ORDER — LISINOPRIL 30 MG/1
30 TABLET ORAL DAILY
Qty: 90 TABLET | Refills: 1 | Status: SHIPPED | OUTPATIENT
Start: 2024-03-12

## 2024-03-19 ENCOUNTER — LAB (OUTPATIENT)
Dept: LAB | Facility: LAB | Age: 44
End: 2024-03-19
Payer: COMMERCIAL

## 2024-03-19 DIAGNOSIS — R73.03 PREDIABETES: ICD-10-CM

## 2024-03-19 DIAGNOSIS — Z79.899 HIGH RISK MEDICATION USE: ICD-10-CM

## 2024-03-19 DIAGNOSIS — E78.49 OTHER HYPERLIPIDEMIA: ICD-10-CM

## 2024-03-19 LAB
ALBUMIN SERPL BCP-MCNC: 4.6 G/DL (ref 3.4–5)
ALP SERPL-CCNC: 88 U/L (ref 33–120)
ALT SERPL W P-5'-P-CCNC: 30 U/L (ref 10–52)
ANION GAP SERPL CALC-SCNC: 16 MMOL/L (ref 10–20)
AST SERPL W P-5'-P-CCNC: 26 U/L (ref 9–39)
BASOPHILS # BLD AUTO: 0.05 X10*3/UL (ref 0–0.1)
BASOPHILS NFR BLD AUTO: 0.7 %
BILIRUB SERPL-MCNC: 0.3 MG/DL (ref 0–1.2)
BUN SERPL-MCNC: 19 MG/DL (ref 6–23)
CALCIUM SERPL-MCNC: 9.9 MG/DL (ref 8.6–10.6)
CHLORIDE SERPL-SCNC: 100 MMOL/L (ref 98–107)
CHOLEST SERPL-MCNC: 233 MG/DL (ref 0–199)
CHOLESTEROL/HDL RATIO: 3.2
CO2 SERPL-SCNC: 29 MMOL/L (ref 21–32)
CREAT SERPL-MCNC: 1.12 MG/DL (ref 0.5–1.3)
CRP SERPL-MCNC: 0.68 MG/DL
EGFRCR SERPLBLD CKD-EPI 2021: 83 ML/MIN/1.73M*2
EOSINOPHIL # BLD AUTO: 0.13 X10*3/UL (ref 0–0.7)
EOSINOPHIL NFR BLD AUTO: 1.9 %
ERYTHROCYTE [DISTWIDTH] IN BLOOD BY AUTOMATED COUNT: 15.8 % (ref 11.5–14.5)
ERYTHROCYTE [SEDIMENTATION RATE] IN BLOOD BY WESTERGREN METHOD: 7 MM/H (ref 0–15)
EST. AVERAGE GLUCOSE BLD GHB EST-MCNC: 126 MG/DL
GLUCOSE SERPL-MCNC: 112 MG/DL (ref 74–99)
HBA1C MFR BLD: 6 %
HCT VFR BLD AUTO: 44.3 % (ref 41–52)
HDLC SERPL-MCNC: 72.7 MG/DL
HGB BLD-MCNC: 14.3 G/DL (ref 13.5–17.5)
IMM GRANULOCYTES # BLD AUTO: 0.02 X10*3/UL (ref 0–0.7)
IMM GRANULOCYTES NFR BLD AUTO: 0.3 % (ref 0–0.9)
LDLC SERPL CALC-MCNC: 125 MG/DL
LYMPHOCYTES # BLD AUTO: 1.96 X10*3/UL (ref 1.2–4.8)
LYMPHOCYTES NFR BLD AUTO: 28.4 %
MCH RBC QN AUTO: 29.2 PG (ref 26–34)
MCHC RBC AUTO-ENTMCNC: 32.3 G/DL (ref 32–36)
MCV RBC AUTO: 90 FL (ref 80–100)
MONOCYTES # BLD AUTO: 0.57 X10*3/UL (ref 0.1–1)
MONOCYTES NFR BLD AUTO: 8.3 %
NEUTROPHILS # BLD AUTO: 4.17 X10*3/UL (ref 1.2–7.7)
NEUTROPHILS NFR BLD AUTO: 60.4 %
NON HDL CHOLESTEROL: 160 MG/DL (ref 0–149)
NRBC BLD-RTO: 0 /100 WBCS (ref 0–0)
PLATELET # BLD AUTO: 315 X10*3/UL (ref 150–450)
POTASSIUM SERPL-SCNC: 5 MMOL/L (ref 3.5–5.3)
PROT SERPL-MCNC: 7.4 G/DL (ref 6.4–8.2)
RBC # BLD AUTO: 4.9 X10*6/UL (ref 4.5–5.9)
SODIUM SERPL-SCNC: 140 MMOL/L (ref 136–145)
TRIGL SERPL-MCNC: 177 MG/DL (ref 0–149)
VLDL: 35 MG/DL (ref 0–40)
WBC # BLD AUTO: 6.9 X10*3/UL (ref 4.4–11.3)

## 2024-03-19 PROCEDURE — 36415 COLL VENOUS BLD VENIPUNCTURE: CPT

## 2024-03-19 PROCEDURE — 80061 LIPID PANEL: CPT

## 2024-03-19 PROCEDURE — 85025 COMPLETE CBC W/AUTO DIFF WBC: CPT

## 2024-03-19 PROCEDURE — 83036 HEMOGLOBIN GLYCOSYLATED A1C: CPT

## 2024-03-19 PROCEDURE — 86140 C-REACTIVE PROTEIN: CPT

## 2024-03-19 PROCEDURE — 80053 COMPREHEN METABOLIC PANEL: CPT

## 2024-03-19 PROCEDURE — 85652 RBC SED RATE AUTOMATED: CPT

## 2024-03-20 NOTE — PROGRESS NOTES
Rheumatology Outpatient Virtual Follow up Note      Subjective   Laureano Dowell is a 43 y.o. male presenting today for Arthritis.    History of Presenting Problem:   Rheum history:  Psoriatic arthritis: recurrent episodes of swelling in right knee (no associated hotness or redness) that resolves after 3-4 days with use of NSAIDs. S/p 2 arthrocentesis for drainage and last time 3/2023 he also had a bilateral knee cortisone injections. He is diagnosed with psoriasis on his knees for 15-20 years.  No eye inflammation, GI disease. No recent GI or  infections. No tick bites/Lyme. No enthesitis or dactylitis.  Arthrocentesis synovial fluid analysis 3/2023 and WBC 2741, N 31%, L 39%, negative cultures, no crystals.  He was started on MTX 3/2023.     Interval history: : He stated MTX 3/2023 he is on 6 pills per week now, he is tolerating well and fatigue has been better.  He denies any new joint swelling. His knee swelling never came back since he was started on MTX. No fever of cough and no recent infection.         Past Medical History:   Past Medical History:   Diagnosis Date    Anxiety 09/18/2023    Generalized anxiety disorder 09/18/2023    Lower abdominal pain, unspecified     Groin pain    Other conditions influencing health status 05/26/2020    History of cough    Panic attacks 09/18/2023    Personal history of other specified conditions 06/25/2020    History of dysuria    Strain of muscle of pelvis 09/18/2023    Urethritis 09/18/2023    Viral infection, unspecified 05/26/2020    Headache due to viral infection       Allergies:   Allergies   Allergen Reactions    Erythromycin Unknown       Medications:   Current Outpatient Medications:     fish oil (Omega-3) 60- mg capsule, Take 2 capsules (1,000 mg) by mouth 2 times a day., Disp: 120 capsule, Rfl: 0    folic acid (Folvite) 1 mg tablet, Take 1 tablet (1 mg) by mouth once daily., Disp: 30 tablet, Rfl: 2    lisinopril 30 mg tablet, TAKE 1 TABLET(30 MG) BY MOUTH  "EVERY DAY, Disp: 90 tablet, Rfl: 1    lovastatin (Mevacor) 40 mg tablet, Take 2 tablets (80 mg) by mouth once daily., Disp: 90 tablet, Rfl: 1    methotrexate (Trexall) 2.5 mg tablet, Take 6 tablets (15 mg total) by mouth 1 (one) time per week.  Follow directions carefully, and ask to explain any part you do not understand. Take exactly as directed., Disp: 25 tablet, Rfl: 2    omeprazole 20 mg tablet,disintegrat, delay rel, Omeprazole 20 MG Oral Tablet Delayed Release Disintegrating Refills: 0, Disp: , Rfl:     Review of Systems:   Constitutional: Denies fever, chills   Eyes: Denies dry eyes, pain in the eyes   ENT: Denies dry mouth, loss of taste, sores in the mouth  Cardiovascular: Denies chest pain, palpitations   Respiratory: Denies shortness of breath   Gastrointestinal: Denies heartburn   Integumentary: Denies photosensitivity, rash or lesions, Raynaud's   Neurological: Denies any numbness or tingling    MSK: As per HPI.     All 10 review of systems have been reviewed and are negative for complaint except as noted in the HPI    Objective   Physical Examination:  Vitals:    03/21/24 1041   BP: 128/85   Pulse: (!) 111   Temp: 36.6 °C (97.8 °F)     Growth %ile SmartLinks can only be used for patients less than 20 years old.  Ht Readings from Last 1 Encounters:   03/21/24 1.791 m (5' 10.5\")     Wt Readings from Last 1 Encounters:   03/21/24 102 kg (225 lb 12.8 oz)         General - NAD, sitting up in chair, well-groomed, pleasant, AAOx3  Head: Normocephalic, atraumatic  Eyes - PERRLA, EOMI. No conjunctiva injection. .   Cardiovascular - Normal S1, S2. Regular rate and rhythm. No murmurs or rubs.  Lungs - Symmetric chest expansion. Clear to auscultation bilaterally.   Skin - No rashes or ulcers. Skin warm and dry. No erythema on bilateral cheeks.  Extremities - No edema, cyanosis ,or clubbing  Neurological - Alert and oriented x 3,  grossly intact. No focal deficit.  Musculoskeletal -  Shoulders: Full ROM, without " pain, no swelling, warmth or tenderness.  Elbows: Full ROM, without pain, no swelling, warmth or tenderness.  Wrists: Full ROM, without pain, no swelling, warmth or tenderness.  MCP: No swelling, warmth or tenderness. Metacarpal squeeze negative  PIP: No swelling, warmth or tenderness.  DIP: No swelling, warmth or tenderness.  Hands : 5/5.    Sacroiliac joints: No local tenderness. YOLI negative.   Hips: Full ROM.  No malalignment.  Knees:  Full ROM, without pain, no swelling, warmth or point tenderness.   Ankles: Full ROM, without pain, swelling, warmth or tenderness.  Toes:  Metatarsal squeeze negative  Cervical spine: No tenderness or limitation of movement  Lumbar spine: No tenderness or limitation of movement            Assessment/Plan   Laureano Dowell is a 43 y.o. male with PMHx of HTN, HLP, anxiety, psoriasis, ?h/o urethritis, and right knee effusion  who presenting today for follow up on PsA:      #Psoriatic arthritis:  -Well controlled  -Reviewed knee arthrocentesis synovial fluid analysis 3/2023 and WBC 2741, N 31%, L 39%, negative cultures, no crystals.  -Synovial fluid from 2/2023: WBC: 483 with 82% L and 18% mono, negative cultures, no crystals  -HLA B27 negative 3/2023  -Reviewed serologies and Xrays to rule out RA, SLE, sarcoidosis, Lyme and other etiologies of oligoarthritis and negative 3/2023.   -Reviewed SI Xrays 3/2023 to evaluate for axial disease and negative.   -Started  MTX 3/2023 and he is on 15 mg weekly and tolerating well  -Reviewed labs from 3/2024 and CBC/CMP normal ESR  and CRP normal    ##Medical management of high risk medication:  -Drug name: MTX  -Tolerating and no side effects  -Folic acid daily  -Labs reviewed as above  -Most recent TB/hep tests negative 3/2023  -Patient is advised to hold therapy if they experience any signs of infection requiring antibiotic therapy, and to resume after full recovery and conclusion of antibiotic course. Patient is advised to contact provider  in this case.  -Educated the patient about risks and benefits of such therapy, and that the benefits of this therapy outweigh the risks and thus we will continue.      The assessment and plan, risk and benefits were discussed with the patient. All of the patients questions were answered and patient agrees to the plan.      Altaf Arthur MD  Clinical   Department of Rheumatology   [facility]

## 2024-03-21 ENCOUNTER — OFFICE VISIT (OUTPATIENT)
Dept: RHEUMATOLOGY | Facility: CLINIC | Age: 44
End: 2024-03-21
Payer: COMMERCIAL

## 2024-03-21 VITALS
BODY MASS INDEX: 31.61 KG/M2 | WEIGHT: 225.8 LBS | HEART RATE: 111 BPM | HEIGHT: 71 IN | SYSTOLIC BLOOD PRESSURE: 128 MMHG | TEMPERATURE: 97.8 F | DIASTOLIC BLOOD PRESSURE: 85 MMHG

## 2024-03-21 DIAGNOSIS — L40.50 PSORIATIC ARTHRITIS (MULTI): ICD-10-CM

## 2024-03-21 DIAGNOSIS — L40.50 PSORIATIC ARTHROPATHY (MULTI): ICD-10-CM

## 2024-03-21 DIAGNOSIS — Z79.899 HIGH RISK MEDICATION USE: Primary | ICD-10-CM

## 2024-03-21 PROCEDURE — 3079F DIAST BP 80-89 MM HG: CPT | Performed by: STUDENT IN AN ORGANIZED HEALTH CARE EDUCATION/TRAINING PROGRAM

## 2024-03-21 PROCEDURE — 99215 OFFICE O/P EST HI 40 MIN: CPT | Performed by: STUDENT IN AN ORGANIZED HEALTH CARE EDUCATION/TRAINING PROGRAM

## 2024-03-21 PROCEDURE — 3074F SYST BP LT 130 MM HG: CPT | Performed by: STUDENT IN AN ORGANIZED HEALTH CARE EDUCATION/TRAINING PROGRAM

## 2024-03-21 RX ORDER — FOLIC ACID 1 MG/1
1 TABLET ORAL DAILY
Qty: 30 TABLET | Refills: 2 | Status: SHIPPED | OUTPATIENT
Start: 2024-03-21

## 2024-03-21 RX ORDER — METHOTREXATE 2.5 MG/1
15 TABLET ORAL
Qty: 25 TABLET | Refills: 2 | Status: SHIPPED | OUTPATIENT
Start: 2024-03-21

## 2024-04-16 DIAGNOSIS — I10 PRIMARY HYPERTENSION: ICD-10-CM

## 2024-04-16 RX ORDER — LOVASTATIN 40 MG/1
TABLET ORAL
Qty: 90 TABLET | Refills: 1 | Status: SHIPPED | OUTPATIENT
Start: 2024-04-16

## 2024-07-22 ENCOUNTER — TELEMEDICINE (OUTPATIENT)
Dept: PRIMARY CARE | Facility: CLINIC | Age: 44
End: 2024-07-22
Payer: COMMERCIAL

## 2024-07-22 DIAGNOSIS — J22 LOWER RESPIRATORY INFECTION (E.G., BRONCHITIS, PNEUMONIA, PNEUMONITIS, PULMONITIS): Primary | ICD-10-CM

## 2024-07-22 PROCEDURE — 99213 OFFICE O/P EST LOW 20 MIN: CPT

## 2024-07-22 RX ORDER — FLUTICASONE PROPIONATE 50 MCG
1 SPRAY, SUSPENSION (ML) NASAL DAILY
Qty: 16 G | Refills: 0 | Status: SHIPPED | OUTPATIENT
Start: 2024-07-22 | End: 2024-08-21

## 2024-07-22 RX ORDER — AMOXICILLIN AND CLAVULANATE POTASSIUM 875; 125 MG/1; MG/1
875 TABLET, FILM COATED ORAL 2 TIMES DAILY
Qty: 14 TABLET | Refills: 0 | Status: SHIPPED | OUTPATIENT
Start: 2024-07-22 | End: 2024-07-29

## 2024-07-22 ASSESSMENT — ENCOUNTER SYMPTOMS
LIGHT-HEADEDNESS: 0
FEVER: 0
COUGH: 1
NAUSEA: 0
HEADACHES: 1
MYALGIAS: 1
DIZZINESS: 0
VOMITING: 0
CHILLS: 0
SHORTNESS OF BREATH: 0
SORE THROAT: 1

## 2024-07-22 ASSESSMENT — COPD QUESTIONNAIRES: COPD: 0

## 2024-07-22 NOTE — PROGRESS NOTES
Subjective   Patient ID: Laureano Dowell is a 44 y.o. male who presents for cough.     With patient's permission, this is a Telemedicine visit with video and audio. Provider located at office address. Patient located at their home address. All issues as documented below were discussed and addressed but limited physical exam was performed. If it was felt that the patient should be evaluated via face-to-face office appointment(s) they were directed to appropriate location.     Cough  This is a new problem. The current episode started in the past 7 days. The problem has been unchanged. The cough is Productive of sputum. Associated symptoms include headaches, myalgias, nasal congestion and a sore throat. Pertinent negatives include no chest pain, chills, fever, postnasal drip or shortness of breath. He has tried rest for the symptoms. The treatment provided mild relief. There is no history of asthma or COPD.        Review of Systems   Constitutional:  Negative for chills and fever.   HENT:  Positive for congestion and sore throat. Negative for postnasal drip.    Respiratory:  Positive for cough. Negative for shortness of breath.    Cardiovascular:  Negative for chest pain.   Gastrointestinal:  Negative for nausea and vomiting.   Musculoskeletal:  Positive for myalgias.   Neurological:  Positive for headaches. Negative for dizziness and light-headedness.     Objective   There were no vitals taken for this visit.    Physical Exam  Not performed due to limitations virtual telemedicine encounter.     Assessment/Plan   Problem List Items Addressed This Visit    None  Visit Diagnoses         Codes    Lower respiratory infection (e.g., bronchitis, pneumonia, pneumonitis, pulmonitis)    -  Primary  Acute.   Augmentin as directed. Risks and benefits of medication discussed and prescribed. Recommend holding weekly dose of Methotrexate while on antibiotic therapy.   OTC Tylenol as directed for aches, sore throat. OTC Mucinex as  directed for cough and chest congestion. Flonase as directed for sinus congestion, ear fullness.  Increase fluids, rest, humidifier.  Follow-up with PCP if symptoms not improve within 7 to 10 days, or sooner for worsening. J22    Relevant Medications    amoxicillin-pot clavulanate (Augmentin) 875-125 mg tablet    fluticasone (Flonase) 50 mcg/actuation nasal spray

## 2024-07-23 DIAGNOSIS — I10 PRIMARY HYPERTENSION: ICD-10-CM

## 2024-07-23 RX ORDER — LOVASTATIN 40 MG/1
TABLET ORAL
Qty: 90 TABLET | Refills: 1 | Status: SHIPPED | OUTPATIENT
Start: 2024-07-23

## 2024-07-29 DIAGNOSIS — L40.50 PSORIATIC ARTHROPATHY (MULTI): ICD-10-CM

## 2024-07-29 RX ORDER — FOLIC ACID 1 MG/1
1 TABLET ORAL DAILY
Qty: 30 TABLET | Refills: 2 | Status: SHIPPED | OUTPATIENT
Start: 2024-07-29

## 2024-08-09 ENCOUNTER — APPOINTMENT (OUTPATIENT)
Dept: PRIMARY CARE | Facility: CLINIC | Age: 44
End: 2024-08-09
Payer: COMMERCIAL

## 2024-08-09 VITALS
DIASTOLIC BLOOD PRESSURE: 78 MMHG | HEIGHT: 71 IN | OXYGEN SATURATION: 97 % | HEART RATE: 92 BPM | BODY MASS INDEX: 33.04 KG/M2 | WEIGHT: 236 LBS | RESPIRATION RATE: 18 BRPM | SYSTOLIC BLOOD PRESSURE: 124 MMHG

## 2024-08-09 DIAGNOSIS — I10 PRIMARY HYPERTENSION: ICD-10-CM

## 2024-08-09 DIAGNOSIS — H65.03 BILATERAL ACUTE SEROUS OTITIS MEDIA, RECURRENCE NOT SPECIFIED: Primary | ICD-10-CM

## 2024-08-09 PROCEDURE — 99213 OFFICE O/P EST LOW 20 MIN: CPT | Performed by: NURSE PRACTITIONER

## 2024-08-09 PROCEDURE — 3074F SYST BP LT 130 MM HG: CPT | Performed by: NURSE PRACTITIONER

## 2024-08-09 PROCEDURE — 3008F BODY MASS INDEX DOCD: CPT | Performed by: NURSE PRACTITIONER

## 2024-08-09 PROCEDURE — 3078F DIAST BP <80 MM HG: CPT | Performed by: NURSE PRACTITIONER

## 2024-08-09 RX ORDER — LISINOPRIL 30 MG/1
30 TABLET ORAL DAILY
Qty: 90 TABLET | Refills: 1 | Status: SHIPPED | OUTPATIENT
Start: 2024-08-09

## 2024-08-09 RX ORDER — IPRATROPIUM BROMIDE 21 UG/1
2 SPRAY, METERED NASAL EVERY 12 HOURS
Qty: 30 ML | Refills: 12 | Status: SHIPPED | OUTPATIENT
Start: 2024-08-09 | End: 2025-08-09

## 2024-08-09 RX ORDER — FLUTICASONE PROPIONATE 50 MCG
1 SPRAY, SUSPENSION (ML) NASAL DAILY
Qty: 16 G | Refills: 0 | Status: SHIPPED | OUTPATIENT
Start: 2024-08-09 | End: 2024-09-08

## 2024-08-09 ASSESSMENT — ENCOUNTER SYMPTOMS
GASTROINTESTINAL NEGATIVE: 1
HEMATOLOGIC/LYMPHATIC NEGATIVE: 1
NEUROLOGICAL NEGATIVE: 1
CARDIOVASCULAR NEGATIVE: 1
EYES NEGATIVE: 1
CONSTITUTIONAL NEGATIVE: 1
ENDOCRINE NEGATIVE: 1
ALLERGIC/IMMUNOLOGIC NEGATIVE: 1
RESPIRATORY NEGATIVE: 1
MUSCULOSKELETAL NEGATIVE: 1
PSYCHIATRIC NEGATIVE: 1

## 2024-08-09 NOTE — PATIENT INSTRUCTIONS
Use flonase one puff in the morning one puff in the evening after saline irrigating your nose. Use atrovent PRN for drying.   Take allegra or zyrtec to dry.

## 2024-08-09 NOTE — PROGRESS NOTES
"Subjective   Patient ID: Laureano Dowell is a 44 y.o. male who presents for Cerumen Impaction (LEFT).    Patient has been having fullness in his ears. Was previously treated for otitis externa.         Review of Systems   Constitutional: Negative.    HENT: Negative.     Eyes: Negative.    Respiratory: Negative.     Cardiovascular: Negative.    Gastrointestinal: Negative.    Endocrine: Negative.    Genitourinary: Negative.    Musculoskeletal: Negative.    Skin: Negative.    Allergic/Immunologic: Negative.    Neurological: Negative.    Hematological: Negative.    Psychiatric/Behavioral: Negative.         Objective   /78   Pulse 92   Resp 18   Ht 1.791 m (5' 10.51\")   Wt 107 kg (236 lb)   SpO2 97%   BMI 33.37 kg/m²     Physical Exam  Vitals reviewed.   HENT:      Head: Normocephalic.      Right Ear: A middle ear effusion (serous air fluid level visible) is present. There is no impacted cerumen.      Left Ear: A middle ear effusion (serous air fluid level visible) is present. There is no impacted cerumen.      Nose: Congestion present. No rhinorrhea.      Mouth/Throat:      Mouth: Mucous membranes are dry.      Pharynx: Oropharynx is clear. No posterior oropharyngeal erythema.   Cardiovascular:      Rate and Rhythm: Normal rate and regular rhythm.      Pulses: Normal pulses.      Heart sounds: Normal heart sounds. No murmur heard.     No friction rub. No gallop.   Pulmonary:      Effort: Pulmonary effort is normal. No respiratory distress.      Breath sounds: Normal breath sounds. No stridor. No wheezing, rhonchi or rales.   Chest:      Chest wall: No tenderness.   Neurological:      General: No focal deficit present.      Mental Status: He is alert and oriented to person, place, and time. Mental status is at baseline.         Assessment/Plan   Problem List Items Addressed This Visit             ICD-10-CM    HTN (hypertension) I10    Relevant Medications    lisinopril 30 mg tablet     Other Visit Diagnoses       "   Codes    Bilateral acute serous otitis media, recurrence not specified    -  Primary H65.03    Relevant Medications    fluticasone (Flonase) 50 mcg/actuation nasal spray    ipratropium (Atrovent) 21 mcg (0.03 %) nasal spray

## 2024-08-27 NOTE — PROGRESS NOTES
Rheumatology Outpatient Virtual Follow up Note      Subjective   Laureano Dowell is a 44 y.o. male presenting today for No chief complaint on file..    History of Presenting Problem:   Rheum history:  Psoriatic arthritis: recurrent episodes of swelling in right knee (no associated hotness or redness) that resolves after 3-4 days with use of NSAIDs. S/p 2 arthrocentesis for drainage and last time 3/2023 he also had a bilateral knee cortisone injections. He is diagnosed with psoriasis on his knees for 15-20 years.  No eye inflammation, GI disease. No recent GI or  infections. No tick bites/Lyme. No enthesitis or dactylitis.  Arthrocentesis synovial fluid analysis 3/2023 and WBC 2741, N 31%, L 39%, negative cultures, no crystals.  He was started on MTX 3/2023.     Interval history: : He stated MTX 3/2023 he is on 6 pills per week now, he is tolerating well and fatigue has been better.  He denies any new joint swelling. His knee swelling never came back since he was started on MTX. No fever of cough and no recent infection.         Past Medical History:   Past Medical History:   Diagnosis Date    Anxiety 09/18/2023    Generalized anxiety disorder 09/18/2023    Lower abdominal pain, unspecified     Groin pain    Other conditions influencing health status 05/26/2020    History of cough    Panic attacks 09/18/2023    Personal history of other specified conditions 06/25/2020    History of dysuria    Strain of muscle of pelvis 09/18/2023    Urethritis 09/18/2023    Viral infection, unspecified 05/26/2020    Headache due to viral infection       Allergies:   Allergies   Allergen Reactions    Erythromycin Unknown       Medications:   Current Outpatient Medications:     folic acid (Folvite) 1 mg tablet, Take 1 tablet (1 mg) by mouth once daily., Disp: 30 tablet, Rfl: 2    lisinopril 30 mg tablet, Take 1 tablet (30 mg) by mouth once daily., Disp: 90 tablet, Rfl: 1    lovastatin (Mevacor) 40 mg tablet, TAKE 2 TABLETS(80 MG) BY  "MOUTH EVERY DAY, Disp: 90 tablet, Rfl: 1    methotrexate (Trexall) 2.5 mg tablet, Take 6 tablets (15 mg total) by mouth 1 (one) time per week.  Follow directions carefully, and ask to explain any part you do not understand. Take exactly as directed., Disp: 25 tablet, Rfl: 2    omeprazole 20 mg tablet,disintegrat, delay rel, Omeprazole 20 MG Oral Tablet Delayed Release Disintegrating Refills: 0, Disp: , Rfl:     Review of Systems:   Constitutional: Denies fever, chills   Eyes: Denies dry eyes, pain in the eyes   ENT: Denies dry mouth, loss of taste, sores in the mouth  Cardiovascular: Denies chest pain, palpitations   Respiratory: Denies shortness of breath   Gastrointestinal: Denies heartburn   Integumentary: Denies photosensitivity, rash or lesions, Raynaud's   Neurological: Denies any numbness or tingling    MSK: As per HPI.     All 10 review of systems have been reviewed and are negative for complaint except as noted in the HPI    Objective   Physical Examination:  There were no vitals filed for this visit.    Growth %ile SmartLinks can only be used for patients less than 20 years old.  Ht Readings from Last 1 Encounters:   08/09/24 1.791 m (5' 10.51\")     Wt Readings from Last 1 Encounters:   08/09/24 107 kg (236 lb)         General - NAD, sitting up in chair, well-groomed, pleasant, AAOx3  Head: Normocephalic, atraumatic  Eyes - PERRLA, EOMI. No conjunctiva injection. .   Cardiovascular - Normal S1, S2. Regular rate and rhythm. No murmurs or rubs.  Lungs - Symmetric chest expansion. Clear to auscultation bilaterally.   Skin - No rashes or ulcers. Skin warm and dry. No erythema on bilateral cheeks.  Extremities - No edema, cyanosis ,or clubbing  Neurological - Alert and oriented x 3,  grossly intact. No focal deficit.  Musculoskeletal -  Shoulders: Full ROM, without pain, no swelling, warmth or tenderness.  Elbows: Full ROM, without pain, no swelling, warmth or tenderness.  Wrists: Full ROM, without pain, no " swelling, warmth or tenderness.  MCP: No swelling, warmth or tenderness. Metacarpal squeeze negative  PIP: No swelling, warmth or tenderness.  DIP: No swelling, warmth or tenderness.  Hands : 5/5.    Sacroiliac joints: No local tenderness. YOLI negative.   Hips: Full ROM.  No malalignment.  Knees:  Full ROM, without pain, no swelling, warmth or point tenderness.   Ankles: Full ROM, without pain, swelling, warmth or tenderness.  Toes:  Metatarsal squeeze negative  Cervical spine: No tenderness or limitation of movement  Lumbar spine: No tenderness or limitation of movement            Assessment/Plan   Laureano Dowell is a 44 y.o. male with PMHx of HTN, HLP, anxiety, psoriasis, ?h/o urethritis, and right knee effusion  who presenting today for follow up on PsA:      #Psoriatic arthritis:  -Well controlled  -Reviewed knee arthrocentesis synovial fluid analysis 3/2023 and WBC 2741, N 31%, L 39%, negative cultures, no crystals.  -Synovial fluid from 2/2023: WBC: 483 with 82% L and 18% mono, negative cultures, no crystals  -HLA B27 negative 3/2023  -Reviewed serologies and Xrays to rule out RA, SLE, sarcoidosis, Lyme and other etiologies of oligoarthritis and negative 3/2023.   -Reviewed SI Xrays 3/2023 to evaluate for axial disease and negative.   -Started  MTX 3/2023 and he is on 15 mg weekly and tolerating well  -Reviewed labs from 8/2024 and CBC/CMP normal CRP, ESR 24    ##Medical management of high risk medication:  -Drug name: MTX  -Tolerating and no side effects  -Folic acid daily  -Labs reviewed as above  -Most recent TB/hep tests negative 3/2023  -Patient is advised to hold therapy if they experience any signs of infection requiring antibiotic therapy, and to resume after full recovery and conclusion of antibiotic course. Patient is advised to contact provider in this case.  -Educated the patient about risks and benefits of such therapy, and that the benefits of this therapy outweigh the risks and thus we will  continue.      The assessment and plan, risk and benefits were discussed with the patient. All of the patients questions were answered and patient agrees to the plan.      Altaf Arthur MD  Clinical   Department of Rheumatology   [facility]

## 2024-08-28 ENCOUNTER — LAB (OUTPATIENT)
Dept: LAB | Facility: LAB | Age: 44
End: 2024-08-28
Payer: COMMERCIAL

## 2024-08-28 ENCOUNTER — APPOINTMENT (OUTPATIENT)
Dept: RHEUMATOLOGY | Facility: CLINIC | Age: 44
End: 2024-08-28
Payer: COMMERCIAL

## 2024-08-28 DIAGNOSIS — L40.50 PSORIATIC ARTHROPATHY (MULTI): ICD-10-CM

## 2024-08-28 DIAGNOSIS — Z79.899 HIGH RISK MEDICATION USE: ICD-10-CM

## 2024-08-28 DIAGNOSIS — L40.50 PSORIATIC ARTHRITIS (MULTI): ICD-10-CM

## 2024-08-28 DIAGNOSIS — Z79.899 HIGH RISK MEDICATION USE: Primary | ICD-10-CM

## 2024-08-28 LAB
ALBUMIN SERPL BCP-MCNC: 4.3 G/DL (ref 3.4–5)
ALP SERPL-CCNC: 87 U/L (ref 33–120)
ALT SERPL W P-5'-P-CCNC: 19 U/L (ref 10–52)
ANION GAP SERPL CALC-SCNC: 15 MMOL/L (ref 10–20)
AST SERPL W P-5'-P-CCNC: 22 U/L (ref 9–39)
BASOPHILS # BLD AUTO: 0.04 X10*3/UL (ref 0–0.1)
BASOPHILS NFR BLD AUTO: 0.7 %
BILIRUB SERPL-MCNC: 0.2 MG/DL (ref 0–1.2)
BUN SERPL-MCNC: 17 MG/DL (ref 6–23)
CALCIUM SERPL-MCNC: 9.6 MG/DL (ref 8.6–10.6)
CHLORIDE SERPL-SCNC: 102 MMOL/L (ref 98–107)
CO2 SERPL-SCNC: 26 MMOL/L (ref 21–32)
CREAT SERPL-MCNC: 0.97 MG/DL (ref 0.5–1.3)
CRP SERPL-MCNC: 0.68 MG/DL
EGFRCR SERPLBLD CKD-EPI 2021: >90 ML/MIN/1.73M*2
EOSINOPHIL # BLD AUTO: 0.1 X10*3/UL (ref 0–0.7)
EOSINOPHIL NFR BLD AUTO: 1.8 %
ERYTHROCYTE [DISTWIDTH] IN BLOOD BY AUTOMATED COUNT: 14.7 % (ref 11.5–14.5)
ERYTHROCYTE [SEDIMENTATION RATE] IN BLOOD BY WESTERGREN METHOD: 24 MM/H (ref 0–15)
GLUCOSE SERPL-MCNC: 106 MG/DL (ref 74–99)
HCT VFR BLD AUTO: 40.2 % (ref 41–52)
HGB BLD-MCNC: 13 G/DL (ref 13.5–17.5)
IMM GRANULOCYTES # BLD AUTO: 0.01 X10*3/UL (ref 0–0.7)
IMM GRANULOCYTES NFR BLD AUTO: 0.2 % (ref 0–0.9)
LYMPHOCYTES # BLD AUTO: 1.67 X10*3/UL (ref 1.2–4.8)
LYMPHOCYTES NFR BLD AUTO: 29.6 %
MCH RBC QN AUTO: 28.3 PG (ref 26–34)
MCHC RBC AUTO-ENTMCNC: 32.3 G/DL (ref 32–36)
MCV RBC AUTO: 87 FL (ref 80–100)
MONOCYTES # BLD AUTO: 0.56 X10*3/UL (ref 0.1–1)
MONOCYTES NFR BLD AUTO: 9.9 %
NEUTROPHILS # BLD AUTO: 3.27 X10*3/UL (ref 1.2–7.7)
NEUTROPHILS NFR BLD AUTO: 57.8 %
NRBC BLD-RTO: 0 /100 WBCS (ref 0–0)
PLATELET # BLD AUTO: 322 X10*3/UL (ref 150–450)
POTASSIUM SERPL-SCNC: 4.8 MMOL/L (ref 3.5–5.3)
PROT SERPL-MCNC: 7.3 G/DL (ref 6.4–8.2)
RBC # BLD AUTO: 4.6 X10*6/UL (ref 4.5–5.9)
SODIUM SERPL-SCNC: 138 MMOL/L (ref 136–145)
WBC # BLD AUTO: 5.7 X10*3/UL (ref 4.4–11.3)

## 2024-08-28 PROCEDURE — 85025 COMPLETE CBC W/AUTO DIFF WBC: CPT

## 2024-08-28 PROCEDURE — 99215 OFFICE O/P EST HI 40 MIN: CPT | Performed by: STUDENT IN AN ORGANIZED HEALTH CARE EDUCATION/TRAINING PROGRAM

## 2024-08-28 PROCEDURE — 86140 C-REACTIVE PROTEIN: CPT

## 2024-08-28 PROCEDURE — 85652 RBC SED RATE AUTOMATED: CPT

## 2024-08-28 PROCEDURE — 80053 COMPREHEN METABOLIC PANEL: CPT

## 2024-08-28 PROCEDURE — 36415 COLL VENOUS BLD VENIPUNCTURE: CPT

## 2024-08-28 RX ORDER — METHOTREXATE 2.5 MG/1
15 TABLET ORAL
Qty: 25 TABLET | Refills: 2 | Status: SHIPPED | OUTPATIENT
Start: 2024-09-01

## 2024-08-28 RX ORDER — FOLIC ACID 1 MG/1
1 TABLET ORAL DAILY
Qty: 30 TABLET | Refills: 2 | Status: SHIPPED | OUTPATIENT
Start: 2024-08-28

## 2024-09-17 DIAGNOSIS — J30.9 ALLERGIC RHINITIS, UNSPECIFIED SEASONALITY, UNSPECIFIED TRIGGER: Primary | ICD-10-CM

## 2024-09-17 DIAGNOSIS — J22 LOWER RESPIRATORY INFECTION (E.G., BRONCHITIS, PNEUMONIA, PNEUMONITIS, PULMONITIS): ICD-10-CM

## 2024-09-17 RX ORDER — FLUTICASONE PROPIONATE 50 MCG
SPRAY, SUSPENSION (ML) NASAL
Qty: 16 G | Refills: 0 | Status: SHIPPED | OUTPATIENT
Start: 2024-09-17

## 2024-10-27 DIAGNOSIS — I10 PRIMARY HYPERTENSION: ICD-10-CM

## 2024-10-28 RX ORDER — LOVASTATIN 40 MG/1
TABLET ORAL
Qty: 90 TABLET | Refills: 1 | Status: SHIPPED | OUTPATIENT
Start: 2024-10-28

## 2024-11-25 ENCOUNTER — TELEPHONE (OUTPATIENT)
Dept: PRIMARY CARE | Facility: CLINIC | Age: 44
End: 2024-11-25
Payer: COMMERCIAL

## 2024-11-25 DIAGNOSIS — Z79.899 HIGH RISK MEDICATION USE: ICD-10-CM

## 2024-11-25 DIAGNOSIS — E78.49 OTHER HYPERLIPIDEMIA: ICD-10-CM

## 2024-11-25 DIAGNOSIS — R73.03 PREDIABETES: Primary | ICD-10-CM

## 2024-11-25 NOTE — TELEPHONE ENCOUNTER
Patient is requesting lab orders he needs for work. He would like to get his labs drawn tomorrow morning.    Glucose  Lipid   A-1C  Hemoglobin    Please call patient when orders are in [phone]    Thank you!

## 2024-11-26 ENCOUNTER — LAB (OUTPATIENT)
Dept: LAB | Facility: LAB | Age: 44
End: 2024-11-26
Payer: COMMERCIAL

## 2024-11-26 DIAGNOSIS — R73.03 PREDIABETES: ICD-10-CM

## 2024-11-26 DIAGNOSIS — Z79.899 HIGH RISK MEDICATION USE: ICD-10-CM

## 2024-11-26 DIAGNOSIS — E78.49 OTHER HYPERLIPIDEMIA: ICD-10-CM

## 2024-11-26 DIAGNOSIS — L40.50 PSORIATIC ARTHRITIS (MULTI): ICD-10-CM

## 2024-11-26 LAB
ANION GAP SERPL CALC-SCNC: 17 MMOL/L (ref 10–20)
BUN SERPL-MCNC: 16 MG/DL (ref 6–23)
CALCIUM SERPL-MCNC: 9.6 MG/DL (ref 8.6–10.6)
CHLORIDE SERPL-SCNC: 101 MMOL/L (ref 98–107)
CHOLEST SERPL-MCNC: 189 MG/DL (ref 0–199)
CHOLESTEROL/HDL RATIO: 3.7
CO2 SERPL-SCNC: 25 MMOL/L (ref 21–32)
CREAT SERPL-MCNC: 1.13 MG/DL (ref 0.5–1.3)
EGFRCR SERPLBLD CKD-EPI 2021: 81 ML/MIN/1.73M*2
ERYTHROCYTE [DISTWIDTH] IN BLOOD BY AUTOMATED COUNT: 15.2 % (ref 11.5–14.5)
EST. AVERAGE GLUCOSE BLD GHB EST-MCNC: 126 MG/DL
GLUCOSE SERPL-MCNC: 118 MG/DL (ref 74–99)
HBA1C MFR BLD: 6 %
HCT VFR BLD AUTO: 41.9 % (ref 41–52)
HDLC SERPL-MCNC: 51.2 MG/DL
HGB BLD-MCNC: 13.7 G/DL (ref 13.5–17.5)
LDLC SERPL CALC-MCNC: 98 MG/DL
MCH RBC QN AUTO: 28.8 PG (ref 26–34)
MCHC RBC AUTO-ENTMCNC: 32.7 G/DL (ref 32–36)
MCV RBC AUTO: 88 FL (ref 80–100)
NON HDL CHOLESTEROL: 138 MG/DL (ref 0–149)
NRBC BLD-RTO: 0 /100 WBCS (ref 0–0)
PLATELET # BLD AUTO: 295 X10*3/UL (ref 150–450)
POTASSIUM SERPL-SCNC: 4.5 MMOL/L (ref 3.5–5.3)
RBC # BLD AUTO: 4.76 X10*6/UL (ref 4.5–5.9)
SODIUM SERPL-SCNC: 138 MMOL/L (ref 136–145)
TRIGL SERPL-MCNC: 198 MG/DL (ref 0–149)
VLDL: 40 MG/DL (ref 0–40)
WBC # BLD AUTO: 5.8 X10*3/UL (ref 4.4–11.3)

## 2024-11-26 PROCEDURE — 83036 HEMOGLOBIN GLYCOSYLATED A1C: CPT

## 2024-11-26 PROCEDURE — 80053 COMPREHEN METABOLIC PANEL: CPT

## 2024-11-26 PROCEDURE — 80061 LIPID PANEL: CPT

## 2024-11-26 PROCEDURE — 36415 COLL VENOUS BLD VENIPUNCTURE: CPT

## 2024-11-26 PROCEDURE — 85027 COMPLETE CBC AUTOMATED: CPT

## 2024-11-27 ENCOUNTER — APPOINTMENT (OUTPATIENT)
Dept: RHEUMATOLOGY | Facility: CLINIC | Age: 44
End: 2024-11-27
Payer: COMMERCIAL

## 2024-11-27 DIAGNOSIS — Z79.899 HIGH RISK MEDICATION USE: Primary | ICD-10-CM

## 2024-11-27 DIAGNOSIS — R73.03 PREDIABETES: Primary | ICD-10-CM

## 2024-11-27 DIAGNOSIS — L40.50 PSORIATIC ARTHROPATHY (MULTI): ICD-10-CM

## 2024-11-27 DIAGNOSIS — L40.50 PSORIATIC ARTHRITIS (MULTI): ICD-10-CM

## 2024-11-27 LAB
ALBUMIN SERPL BCP-MCNC: 4.5 G/DL (ref 3.4–5)
ALP SERPL-CCNC: 89 U/L (ref 33–120)
ALT SERPL W P-5'-P-CCNC: 30 U/L (ref 10–52)
AST SERPL W P-5'-P-CCNC: 27 U/L (ref 9–39)
BILIRUB DIRECT SERPL-MCNC: 0 MG/DL (ref 0–0.3)
BILIRUB SERPL-MCNC: 0.5 MG/DL (ref 0–1.2)
PROT SERPL-MCNC: 7.3 G/DL (ref 6.4–8.2)

## 2024-11-27 PROCEDURE — 99215 OFFICE O/P EST HI 40 MIN: CPT | Performed by: STUDENT IN AN ORGANIZED HEALTH CARE EDUCATION/TRAINING PROGRAM

## 2024-11-27 RX ORDER — METHOTREXATE 2.5 MG/1
15 TABLET ORAL
Qty: 25 TABLET | Refills: 2 | Status: SHIPPED | OUTPATIENT
Start: 2024-12-01

## 2024-11-27 RX ORDER — METFORMIN HYDROCHLORIDE 500 MG/1
500 TABLET, EXTENDED RELEASE ORAL
Qty: 90 TABLET | Refills: 1 | Status: SHIPPED | OUTPATIENT
Start: 2024-11-27 | End: 2025-05-26

## 2024-11-27 NOTE — RESULT ENCOUNTER NOTE
Your A1C remains elevated which means you are prediabetic. Your average blood sugar is around 126 we prefer . We should start you on Metformin which would reduce the amount of sugar made in your liver and increase your bodies usage of sugar in the muscle tissue. And recheck your A1c in the office in 3-6 months.

## 2024-12-02 DIAGNOSIS — L40.50 PSORIATIC ARTHROPATHY (MULTI): ICD-10-CM

## 2024-12-02 DIAGNOSIS — Z79.899 HIGH RISK MEDICATION USE: ICD-10-CM

## 2024-12-02 DIAGNOSIS — L40.50 PSORIATIC ARTHRITIS (MULTI): ICD-10-CM

## 2024-12-02 RX ORDER — FOLIC ACID 1 MG/1
1 TABLET ORAL DAILY
Qty: 30 TABLET | Refills: 2 | Status: SHIPPED | OUTPATIENT
Start: 2024-12-02

## 2025-01-06 ENCOUNTER — TELEPHONE (OUTPATIENT)
Facility: CLINIC | Age: 45
End: 2025-01-06
Payer: COMMERCIAL

## 2025-01-06 NOTE — TELEPHONE ENCOUNTER
"I spoke with Dr. Arthur and she said \"For high risk medications like methotrexate, we unfortunately can't do more than 3 months\" between appointments.   Any further questions please feel free to contact us at [phone] or by [facility]   "

## 2025-01-20 DIAGNOSIS — Z12.11 SCREENING FOR COLON CANCER: Primary | ICD-10-CM

## 2025-01-30 DIAGNOSIS — I10 PRIMARY HYPERTENSION: ICD-10-CM

## 2025-01-30 RX ORDER — LOVASTATIN 40 MG/1
TABLET ORAL
Qty: 90 TABLET | Refills: 1 | Status: SHIPPED | OUTPATIENT
Start: 2025-01-30

## 2025-02-06 DIAGNOSIS — R19.5 POSITIVE COLORECTAL CANCER SCREENING USING COLOGUARD TEST: Primary | ICD-10-CM

## 2025-02-06 LAB — NONINV COLON CA DNA+OCC BLD SCRN STL QL: POSITIVE

## 2025-02-17 DIAGNOSIS — I10 PRIMARY HYPERTENSION: ICD-10-CM

## 2025-02-17 RX ORDER — LISINOPRIL 30 MG/1
30 TABLET ORAL DAILY
Qty: 90 TABLET | Refills: 1 | Status: SHIPPED | OUTPATIENT
Start: 2025-02-17

## 2025-02-25 ENCOUNTER — TELEPHONE (OUTPATIENT)
Dept: PRIMARY CARE | Facility: CLINIC | Age: 45
End: 2025-02-25
Payer: COMMERCIAL

## 2025-02-25 DIAGNOSIS — Z12.11 SCREENING FOR COLON CANCER: Primary | ICD-10-CM

## 2025-02-25 DIAGNOSIS — R19.5 POSITIVE COLORECTAL CANCER SCREENING USING COLOGUARD TEST: ICD-10-CM

## 2025-02-25 NOTE — TELEPHONE ENCOUNTER
Alda, patient's wife, called regarding the colonoscopy. She was in touch with her insurance company.  They will not pay for a diagnostic colonoscopy since he had the Cologuard done.    They are unable to pay out of pocket for a colonoscopy.    The insurance company lead her to believe they would pay for a preventative screening colonoscopy and the PCP would disregard the Cologuard results.      Patient is requesting your thoughts and if this can be done

## 2025-02-26 ENCOUNTER — APPOINTMENT (OUTPATIENT)
Facility: CLINIC | Age: 45
End: 2025-02-26
Payer: COMMERCIAL

## 2025-02-26 DIAGNOSIS — L40.50 PSORIATIC ARTHROPATHY (MULTI): ICD-10-CM

## 2025-02-26 DIAGNOSIS — L40.50 PSORIATIC ARTHRITIS (MULTI): ICD-10-CM

## 2025-02-26 DIAGNOSIS — Z79.899 HIGH RISK MEDICATION USE: Primary | ICD-10-CM

## 2025-02-26 PROCEDURE — 99215 OFFICE O/P EST HI 40 MIN: CPT | Performed by: STUDENT IN AN ORGANIZED HEALTH CARE EDUCATION/TRAINING PROGRAM

## 2025-02-26 RX ORDER — FOLIC ACID 1 MG/1
1 TABLET ORAL DAILY
Qty: 30 TABLET | Refills: 2 | Status: SHIPPED | OUTPATIENT
Start: 2025-02-26

## 2025-02-26 RX ORDER — METHOTREXATE 2.5 MG/1
15 TABLET ORAL
Qty: 25 TABLET | Refills: 2 | Status: SHIPPED | OUTPATIENT
Start: 2025-03-02

## 2025-02-26 NOTE — PROGRESS NOTES
Rheumatology Outpatient Virtual Follow up Note    Virtual or Telephone Consent    An interactive audio and video telecommunication system which permits real time communications between the patient (at the originating site) and provider (at the distant site) was utilized to provide this telehealth service.   Verbal consent was requested and obtained from Laureano Dowell on this date, 02/26/25 for a telehealth visit and the patient's location was confirmed at the time of the visit.    Subjective   Laureano Dowell is a 44 y.o. male presenting today for Joint Pain.    History of Presenting Problem:   Rheum history:  Psoriatic arthritis: recurrent episodes of swelling in right knee (no associated hotness or redness) that resolves after 3-4 days with use of NSAIDs. S/p 2 arthrocentesis for drainage and last time 3/2023 he also had a bilateral knee cortisone injections. He is diagnosed with psoriasis on his knees for 15-20 years.  No eye inflammation, GI disease. No recent GI or  infections. No tick bites/Lyme. No enthesitis or dactylitis.  Arthrocentesis synovial fluid analysis 3/2023 and WBC 2741, N 31%, L 39%, negative cultures, no crystals.  He was started on MTX 3/2023.     Interval history: : He is on MTX  6 pills per week now.   He denies any new joint swelling. His knee swelling never came back since he was started on MTX. No fever of cough and no recent infection.         Past Medical History:   Past Medical History:   Diagnosis Date    Anxiety 09/18/2023    Generalized anxiety disorder 09/18/2023    Lower abdominal pain, unspecified     Groin pain    Other conditions influencing health status 05/26/2020    History of cough    Panic attacks 09/18/2023    Personal history of other specified conditions 06/25/2020    History of dysuria    Strain of muscle of pelvis 09/18/2023    Urethritis 09/18/2023    Viral infection, unspecified 05/26/2020    Headache due to viral infection       Allergies:   Allergies   Allergen  "Reactions    Erythromycin Unknown       Medications:   Current Outpatient Medications:     folic acid (Folvite) 1 mg tablet, Take 1 tablet (1 mg) by mouth once daily., Disp: 30 tablet, Rfl: 2    lisinopril 30 mg tablet, TAKE 1 TABLET(30 MG) BY MOUTH DAILY, Disp: 90 tablet, Rfl: 1    lovastatin (Mevacor) 40 mg tablet, TAKE 2 TABLETS(80 MG) BY MOUTH EVERY DAY, Disp: 90 tablet, Rfl: 1    metFORMIN  mg 24 hr tablet, Take 1 tablet (500 mg) by mouth once daily in the evening. Take with meals. Do not crush, chew, or split., Disp: 90 tablet, Rfl: 1    methotrexate (Trexall) 2.5 mg tablet, Take 6 tablets (15 mg total) by mouth 1 (one) time per week.  Follow directions carefully, and ask to explain any part you do not understand. Take exactly as directed., Disp: 25 tablet, Rfl: 2    omeprazole 20 mg tablet,disintegrat, delay rel, Omeprazole 20 MG Oral Tablet Delayed Release Disintegrating Refills: 0, Disp: , Rfl:     Review of Systems:   Constitutional: Denies fever, chills   Eyes: Denies dry eyes, pain in the eyes   ENT: Denies dry mouth, loss of taste, sores in the mouth  Cardiovascular: Denies chest pain, palpitations   Respiratory: Denies shortness of breath   Gastrointestinal: Denies heartburn   Integumentary: Denies photosensitivity, rash or lesions, Raynaud's   Neurological: Denies any numbness or tingling    MSK: As per HPI.     All 10 review of systems have been reviewed and are negative for complaint except as noted in the HPI    Objective   Physical Examination:  There were no vitals filed for this visit.    Growth %[employer] SmartLinks can only be used for patients less than 20 years old.  Ht Readings from Last 1 Encounters:   08/09/24 1.791 m (5' 10.51\")     Wt Readings from Last 1 Encounters:   08/09/24 107 kg (236 lb)     Exam limited, virtual visit        Assessment/Plan   Laureano Dowell is a 44 y.o. male with PMHx of HTN, HLP, anxiety, psoriasis, ?h/o urethritis, and right knee effusion  who presenting today for " follow up on PsA:      #Psoriatic arthritis:  -Well controlled  -Reviewed knee arthrocentesis synovial fluid analysis 3/2023 and WBC 2741, N 31%, L 39%, negative cultures, no crystals.  -Synovial fluid from 2/2023: WBC: 483 with 82% L and 18% mono, negative cultures, no crystals  -HLA B27 negative 3/2023  -Reviewed serologies and Xrays to rule out RA, SLE, sarcoidosis, Lyme and other etiologies of oligoarthritis and negative 3/2023.   -Reviewed SI Xrays 3/2023 to evaluate for axial disease and negative.   -Continue  MTX 15 mg weekly + Folic acid 1 mg daily  -Reviewed labs from 8/2024 and CBC/CMP normal CRP, ESR 24. CBC/LFT normal 11/2024    ##Medical management of high risk medication:  -Drug name: MTX  -Tolerating and no side effects  -Folic acid daily  -Labs reviewed as above  -Most recent TB/hep tests negative 3/2023  -Patient is advised to hold therapy if they experience any signs of infection requiring antibiotic therapy, and to resume after full recovery and conclusion of antibiotic course. Patient is advised to contact provider in this case.  -Educated the patient about risks and benefits of such therapy, and that the benefits of this therapy outweigh the risks and thus we will continue.      The assessment and plan, risk and benefits were discussed with the patient. All of the patients questions were answered and patient agrees to the plan.      Altaf Arthur MD  Clinical   Department of Rheumatology   [facility]

## 2025-02-27 LAB
ALBUMIN SERPL-MCNC: 4.7 G/DL (ref 3.6–5.1)
ALP SERPL-CCNC: 93 U/L (ref 36–130)
ALT SERPL-CCNC: 31 U/L (ref 9–46)
ANION GAP SERPL CALCULATED.4IONS-SCNC: 11 MMOL/L (CALC) (ref 7–17)
AST SERPL-CCNC: 27 U/L (ref 10–40)
BASOPHILS # BLD AUTO: 51 CELLS/UL (ref 0–200)
BASOPHILS NFR BLD AUTO: 0.8 %
BILIRUB SERPL-MCNC: 0.4 MG/DL (ref 0.2–1.2)
BUN SERPL-MCNC: 15 MG/DL (ref 7–25)
CALCIUM SERPL-MCNC: 9.6 MG/DL (ref 8.6–10.3)
CHLORIDE SERPL-SCNC: 102 MMOL/L (ref 98–110)
CO2 SERPL-SCNC: 27 MMOL/L (ref 20–32)
CREAT SERPL-MCNC: 1.07 MG/DL (ref 0.6–1.29)
CRP SERPL-MCNC: 4.1 MG/L
EGFRCR SERPLBLD CKD-EPI 2021: 87 ML/MIN/1.73M2
EOSINOPHIL # BLD AUTO: 141 CELLS/UL (ref 15–500)
EOSINOPHIL NFR BLD AUTO: 2.2 %
ERYTHROCYTE [DISTWIDTH] IN BLOOD BY AUTOMATED COUNT: 15.7 % (ref 11–15)
ERYTHROCYTE [SEDIMENTATION RATE] IN BLOOD BY WESTERGREN METHOD: 6 MM/H
GLUCOSE SERPL-MCNC: 93 MG/DL (ref 65–139)
HCT VFR BLD AUTO: 43.1 % (ref 38.5–50)
HGB BLD-MCNC: 13.6 G/DL (ref 13.2–17.1)
LYMPHOCYTES # BLD AUTO: 1786 CELLS/UL (ref 850–3900)
LYMPHOCYTES NFR BLD AUTO: 27.9 %
MCH RBC QN AUTO: 27.6 PG (ref 27–33)
MCHC RBC AUTO-ENTMCNC: 31.6 G/DL (ref 32–36)
MCV RBC AUTO: 87.4 FL (ref 80–100)
MONOCYTES # BLD AUTO: 678 CELLS/UL (ref 200–950)
MONOCYTES NFR BLD AUTO: 10.6 %
NEUTROPHILS # BLD AUTO: 3744 CELLS/UL (ref 1500–7800)
NEUTROPHILS NFR BLD AUTO: 58.5 %
PLATELET # BLD AUTO: 358 THOUSAND/UL (ref 140–400)
PMV BLD REES-ECKER: 9.6 FL (ref 7.5–12.5)
POTASSIUM SERPL-SCNC: 5 MMOL/L (ref 3.5–5.3)
PROT SERPL-MCNC: 7.1 G/DL (ref 6.1–8.1)
RBC # BLD AUTO: 4.93 MILLION/UL (ref 4.2–5.8)
SODIUM SERPL-SCNC: 140 MMOL/L (ref 135–146)
WBC # BLD AUTO: 6.4 THOUSAND/UL (ref 3.8–10.8)

## 2025-03-03 ENCOUNTER — TELEPHONE (OUTPATIENT)
Dept: PRIMARY CARE | Facility: CLINIC | Age: 45
End: 2025-03-03
Payer: COMMERCIAL

## 2025-03-03 DIAGNOSIS — Z12.11 SCREENING FOR COLON CANCER: Primary | ICD-10-CM

## 2025-03-03 RX ORDER — POLYETHYLENE GLYCOL 3350, SODIUM SULFATE ANHYDROUS, SODIUM BICARBONATE, SODIUM CHLORIDE, POTASSIUM CHLORIDE 236; 22.74; 6.74; 5.86; 2.97 G/4L; G/4L; G/4L; G/4L; G/4L
4 POWDER, FOR SOLUTION ORAL ONCE
Qty: 4000 ML | Refills: 0 | Status: SHIPPED | OUTPATIENT
Start: 2025-03-03 | End: 2025-03-03

## 2025-03-03 NOTE — TELEPHONE ENCOUNTER
Patient's wife called to be sure his upcoming colonoscopy is ordered and billed correctly.    She needs it to be coded and billed as preventative so the insurance will pay for it.

## 2025-03-03 NOTE — TELEPHONE ENCOUNTER
Pended colonoscopy prep, Gol[facility], for approval.  Instructions were sent to patient via [employer], and notified via the phone to  prescription in the next couple of days.

## 2025-04-03 DIAGNOSIS — L40.50 PSORIATIC ARTHROPATHY (MULTI): ICD-10-CM

## 2025-04-03 DIAGNOSIS — Z79.899 HIGH RISK MEDICATION USE: ICD-10-CM

## 2025-04-03 DIAGNOSIS — L40.50 PSORIATIC ARTHRITIS (MULTI): ICD-10-CM

## 2025-04-03 RX ORDER — FOLIC ACID 1 MG/1
1 TABLET ORAL DAILY
Qty: 30 TABLET | Refills: 2 | Status: SHIPPED | OUTPATIENT
Start: 2025-04-03

## 2025-04-30 DIAGNOSIS — I10 PRIMARY HYPERTENSION: ICD-10-CM

## 2025-04-30 DIAGNOSIS — E78.49 OTHER HYPERLIPIDEMIA: Primary | ICD-10-CM

## 2025-04-30 RX ORDER — LOVASTATIN 40 MG/1
TABLET ORAL
Qty: 90 TABLET | Refills: 1 | Status: SHIPPED | OUTPATIENT
Start: 2025-04-30

## 2025-05-04 ENCOUNTER — PATIENT MESSAGE (OUTPATIENT)
Facility: CLINIC | Age: 45
End: 2025-05-04
Payer: COMMERCIAL

## 2025-05-04 DIAGNOSIS — L40.50 PSORIATIC ARTHROPATHY (MULTI): ICD-10-CM

## 2025-05-04 DIAGNOSIS — L40.50 PSORIATIC ARTHRITIS (MULTI): ICD-10-CM

## 2025-05-04 DIAGNOSIS — Z79.899 HIGH RISK MEDICATION USE: ICD-10-CM

## 2025-05-05 RX ORDER — FOLIC ACID 1 MG/1
1 TABLET ORAL DAILY
Qty: 30 TABLET | Refills: 2 | Status: SHIPPED | OUTPATIENT
Start: 2025-05-05

## 2025-05-05 RX ORDER — METHOTREXATE 2.5 MG/1
15 TABLET ORAL WEEKLY
Qty: 25 TABLET | Refills: 1 | Status: SHIPPED | OUTPATIENT
Start: 2025-05-05

## 2025-05-14 ENCOUNTER — APPOINTMENT (OUTPATIENT)
Dept: OPERATING ROOM | Facility: CLINIC | Age: 45
End: 2025-05-14
Payer: COMMERCIAL

## 2025-05-18 DIAGNOSIS — I10 PRIMARY HYPERTENSION: ICD-10-CM

## 2025-05-19 RX ORDER — LISINOPRIL 30 MG/1
30 TABLET ORAL DAILY
Qty: 90 TABLET | Refills: 1 | Status: SHIPPED | OUTPATIENT
Start: 2025-05-19

## 2025-05-27 DIAGNOSIS — R73.03 PREDIABETES: ICD-10-CM

## 2025-05-27 RX ORDER — METFORMIN HYDROCHLORIDE 500 MG/1
TABLET, EXTENDED RELEASE ORAL
Qty: 90 TABLET | Refills: 1 | Status: SHIPPED | OUTPATIENT
Start: 2025-05-27

## 2025-06-12 DIAGNOSIS — I10 PRIMARY HYPERTENSION: ICD-10-CM

## 2025-06-12 RX ORDER — LOVASTATIN 40 MG/1
TABLET ORAL
Qty: 180 TABLET | Refills: 0 | Status: SHIPPED | OUTPATIENT
Start: 2025-06-12 | End: 2025-09-10

## 2025-07-11 LAB
ALBUMIN SERPL-MCNC: 4.7 G/DL (ref 3.6–5.1)
ALP SERPL-CCNC: 103 U/L (ref 36–130)
ALT SERPL-CCNC: 32 U/L (ref 9–46)
ANION GAP SERPL CALCULATED.4IONS-SCNC: 9 MMOL/L (CALC) (ref 7–17)
AST SERPL-CCNC: 32 U/L (ref 10–40)
BASOPHILS # BLD AUTO: 51 CELLS/UL (ref 0–200)
BASOPHILS NFR BLD AUTO: 0.7 %
BILIRUB SERPL-MCNC: 0.5 MG/DL (ref 0.2–1.2)
BUN SERPL-MCNC: 19 MG/DL (ref 7–25)
CALCIUM SERPL-MCNC: 9.9 MG/DL (ref 8.6–10.3)
CHLORIDE SERPL-SCNC: 100 MMOL/L (ref 98–110)
CO2 SERPL-SCNC: 27 MMOL/L (ref 20–32)
CREAT SERPL-MCNC: 1.1 MG/DL (ref 0.6–1.29)
CRP SERPL-MCNC: 3.8 MG/L
EGFRCR SERPLBLD CKD-EPI 2021: 83 ML/MIN/1.73M2
EOSINOPHIL # BLD AUTO: 146 CELLS/UL (ref 15–500)
EOSINOPHIL NFR BLD AUTO: 2 %
ERYTHROCYTE [DISTWIDTH] IN BLOOD BY AUTOMATED COUNT: 16.6 % (ref 11–15)
ERYTHROCYTE [SEDIMENTATION RATE] IN BLOOD BY WESTERGREN METHOD: 6 MM/H
GLUCOSE SERPL-MCNC: 133 MG/DL (ref 65–99)
HCT VFR BLD AUTO: 44.7 % (ref 38.5–50)
HGB BLD-MCNC: 14.1 G/DL (ref 13.2–17.1)
LYMPHOCYTES # BLD AUTO: 2066 CELLS/UL (ref 850–3900)
LYMPHOCYTES NFR BLD AUTO: 28.3 %
MCH RBC QN AUTO: 28.3 PG (ref 27–33)
MCHC RBC AUTO-ENTMCNC: 31.5 G/DL (ref 32–36)
MCV RBC AUTO: 89.6 FL (ref 80–100)
MONOCYTES # BLD AUTO: 526 CELLS/UL (ref 200–950)
MONOCYTES NFR BLD AUTO: 7.2 %
NEUTROPHILS # BLD AUTO: 4511 CELLS/UL (ref 1500–7800)
NEUTROPHILS NFR BLD AUTO: 61.8 %
PLATELET # BLD AUTO: 349 THOUSAND/UL (ref 140–400)
PMV BLD REES-ECKER: 9.2 FL (ref 7.5–12.5)
POTASSIUM SERPL-SCNC: 5 MMOL/L (ref 3.5–5.3)
PROT SERPL-MCNC: 7.7 G/DL (ref 6.1–8.1)
RBC # BLD AUTO: 4.99 MILLION/UL (ref 4.2–5.8)
SODIUM SERPL-SCNC: 136 MMOL/L (ref 135–146)
WBC # BLD AUTO: 7.3 THOUSAND/UL (ref 3.8–10.8)

## 2025-07-14 ENCOUNTER — APPOINTMENT (OUTPATIENT)
Facility: CLINIC | Age: 45
End: 2025-07-14
Payer: COMMERCIAL

## 2025-07-14 DIAGNOSIS — L40.50 PSORIATIC ARTHROPATHY (MULTI): ICD-10-CM

## 2025-07-14 DIAGNOSIS — L40.50 PSORIATIC ARTHRITIS (MULTI): ICD-10-CM

## 2025-07-14 DIAGNOSIS — Z79.899 HIGH RISK MEDICATION USE: Primary | ICD-10-CM

## 2025-07-14 PROCEDURE — 99215 OFFICE O/P EST HI 40 MIN: CPT | Performed by: STUDENT IN AN ORGANIZED HEALTH CARE EDUCATION/TRAINING PROGRAM

## 2025-07-14 RX ORDER — FOLIC ACID 1 MG/1
1 TABLET ORAL DAILY
Qty: 30 TABLET | Refills: 2 | Status: SHIPPED | OUTPATIENT
Start: 2025-07-14

## 2025-07-14 RX ORDER — METHOTREXATE 2.5 MG/1
15 TABLET ORAL WEEKLY
Qty: 25 TABLET | Refills: 1 | Status: SHIPPED | OUTPATIENT
Start: 2025-07-14

## 2025-07-14 NOTE — PROGRESS NOTES
Rheumatology Outpatient Virtual Follow up Note    Virtual or Telephone Consent    An interactive audio and video telecommunication system which permits real time communications between the patient (at the originating site) and provider (at the distant site) was utilized to provide this telehealth service.   Verbal consent was requested and obtained from Laureano Dowell on this date, 07/14/25 for a telehealth visit and the patient's location was confirmed at the time of the visit.    Subjective   Laureano Dowell is a 45 y.o. male presenting today for Joint Pain.    History of Presenting Problem:   Rheum history:  Psoriatic arthritis: recurrent episodes of swelling in right knee (no associated hotness or redness) that resolves after 3-4 days with use of NSAIDs. S/p 2 arthrocentesis for drainage and last time 3/2023 he also had a bilateral knee cortisone injections. He is diagnosed with psoriasis on his knees for 15-20 years.  No eye inflammation, GI disease. No recent GI or  infections. No tick bites/Lyme. No enthesitis or dactylitis.  Arthrocentesis synovial fluid analysis 3/2023 and WBC 2741, N 31%, L 39%, negative cultures, no crystals.  He was started on MTX 3/2023.     Interval history: : He is on MTX  6 pills per week now.   He denies any new joint swelling. His knee swelling never came back since he was started on MTX. No fever of cough and no recent infection.         Past Medical History:   Past Medical History:   Diagnosis Date    Anxiety 09/18/2023    Generalized anxiety disorder 09/18/2023    Lower abdominal pain, unspecified     Groin pain    Other conditions influencing health status 05/26/2020    History of cough    Panic attacks 09/18/2023    Personal history of other specified conditions 06/25/2020    History of dysuria    Psoriatic arthritis (Multi) April 2023    Strain of muscle of pelvis 09/18/2023    Urethritis 09/18/2023    Viral infection, unspecified 05/26/2020    Headache due to viral infection  "      Allergies:   Allergies   Allergen Reactions    Erythromycin Unknown       Medications:   Current Outpatient Medications:     folic acid (Folvite) 1 mg tablet, Take 1 tablet (1 mg) by mouth once daily., Disp: 30 tablet, Rfl: 2    lisinopril 30 mg tablet, TAKE 1 TABLET(30 MG) BY MOUTH EVERY DAY, Disp: 90 tablet, Rfl: 1    lovastatin (Mevacor) 40 mg tablet, TAKE 2 TABLETS(80 MG) BY MOUTH EVERY DAY, Disp: 180 tablet, Rfl: 0    metFORMIN  mg 24 hr tablet, TAKE 1 TABLET(500 MG) BY MOUTH DAILY IN THE EVENING WITH MEALS. DO NOT CRUSH, CHEW, OR SPLIT, Disp: 90 tablet, Rfl: 1    methotrexate (Trexall) 2.5 mg tablet, Take 6 tablets (15 mg total) by mouth 1 (one) time per week.  Follow directions carefully, and ask to explain any part you do not understand. Take exactly as directed., Disp: 25 tablet, Rfl: 1    omeprazole 20 mg tablet,disintegrat, delay rel, Omeprazole 20 MG Oral Tablet Delayed Release Disintegrating Refills: 0, Disp: , Rfl:     Review of Systems:   All 10 review of systems have been reviewed and are negative for complaint except as noted in the HPI    Objective   Physical Examination:  There were no vitals filed for this visit.    Growth %ile SmartLinks can only be used for patients less than 20 years old.  Ht Readings from Last 1 Encounters:   08/09/24 1.791 m (5' 10.51\")     Wt Readings from Last 1 Encounters:   08/09/24 107 kg (236 lb)     Exam limited, virtual visit        Assessment/Plan   Laureano Dowell is a 45 y.o. male with PMHx of HTN, HLP, anxiety, psoriasis, ?h/o urethritis, and right knee effusion  who presenting today for follow up on PsA:      #Psoriatic arthritis:  -Well controlled  -Reviewed knee arthrocentesis synovial fluid analysis 3/2023 and WBC 2741, N 31%, L 39%, negative cultures, no crystals.  -Synovial fluid from 2/2023: WBC: 483 with 82% L and 18% mono, negative cultures, no crystals  -HLA B27 negative 3/2023  -Reviewed serologies and Xrays to rule out RA, SLE, sarcoidosis, " Lyme and other etiologies of oligoarthritis and negative 3/2023.   -Reviewed SI Xrays 3/2023 to evaluate for axial disease and negative.   -Continue  MTX 15 mg weekly + Folic acid 1 mg daily  -Reviewed labs from 7/2025 and CBC/CMP/ESR/CRP normal    ##Medical management of high risk medication:  -Drug name: MTX  -Tolerating and no side effects  -Folic acid daily  -Labs reviewed as above  -Most recent TB/hep tests negative 3/2023  -Patient is advised to hold therapy if they experience any signs of infection requiring antibiotic therapy, and to resume after full recovery and conclusion of antibiotic course. Patient is advised to contact provider in this case.  -Educated the patient about risks and benefits of such therapy, and that the benefits of this therapy outweigh the risks and thus we will continue.      The assessment and plan, risk and benefits were discussed with the patient. All of the patients questions were answered and patient agrees to the plan.      Altaf Arthur MD  Clinical   Department of Rheumatology   [facility]

## 2025-08-12 DIAGNOSIS — L40.50 PSORIATIC ARTHROPATHY (MULTI): ICD-10-CM

## 2025-08-12 DIAGNOSIS — L40.50 PSORIATIC ARTHRITIS (MULTI): ICD-10-CM

## 2025-08-12 DIAGNOSIS — Z79.899 HIGH RISK MEDICATION USE: ICD-10-CM

## 2025-08-12 RX ORDER — FOLIC ACID 1 MG/1
1 TABLET ORAL DAILY
Qty: 30 TABLET | Refills: 2 | Status: SHIPPED | OUTPATIENT
Start: 2025-08-12

## 2025-10-13 ENCOUNTER — APPOINTMENT (OUTPATIENT)
Facility: CLINIC | Age: 45
End: 2025-10-13
Payer: COMMERCIAL